# Patient Record
Sex: MALE | Race: WHITE | NOT HISPANIC OR LATINO | Employment: OTHER | URBAN - METROPOLITAN AREA
[De-identification: names, ages, dates, MRNs, and addresses within clinical notes are randomized per-mention and may not be internally consistent; named-entity substitution may affect disease eponyms.]

---

## 2021-12-18 ENCOUNTER — HOSPITAL ENCOUNTER (EMERGENCY)
Facility: HOSPITAL | Age: 66
Discharge: HOME | End: 2021-12-18
Attending: EMERGENCY MEDICINE
Payer: MEDICARE

## 2021-12-18 VITALS
WEIGHT: 250 LBS | DIASTOLIC BLOOD PRESSURE: 96 MMHG | HEART RATE: 69 BPM | OXYGEN SATURATION: 98 % | RESPIRATION RATE: 20 BRPM | BODY MASS INDEX: 37.89 KG/M2 | SYSTOLIC BLOOD PRESSURE: 132 MMHG | HEIGHT: 68 IN | TEMPERATURE: 98 F

## 2021-12-18 DIAGNOSIS — L72.3 SEBACEOUS CYST: Primary | ICD-10-CM

## 2021-12-18 PROCEDURE — 99282 EMERGENCY DEPT VISIT SF MDM: CPT

## 2021-12-18 RX ORDER — LACOSAMIDE 100 MG/1
TABLET ORAL EVERY 12 HOURS
COMMUNITY
End: 2022-02-15 | Stop reason: SDUPTHER

## 2021-12-18 RX ORDER — LEVOTHYROXINE SODIUM 175 UG/1
175 TABLET ORAL
COMMUNITY

## 2021-12-18 RX ORDER — HYDROGEN PEROXIDE 3 %
20 SOLUTION, NON-ORAL MISCELLANEOUS
COMMUNITY
End: 2022-02-15 | Stop reason: DRUGHIGH

## 2022-02-02 ENCOUNTER — TELEPHONE (OUTPATIENT)
Dept: SURGERY | Facility: CLINIC | Age: 67
End: 2022-02-02
Payer: MEDICARE

## 2022-02-02 ENCOUNTER — TELEPHONE (OUTPATIENT)
Dept: NEUROLOGY | Facility: CLINIC | Age: 67
End: 2022-02-02
Payer: MEDICARE

## 2022-02-02 NOTE — TELEPHONE ENCOUNTER
----- Message from Katherine Briggs sent at 2/2/2022  9:52 AM CST -----  Who Called: Patient    What is the reqeust in detail: Requesting call back to schedule NP appointment to be seen for his neurological ailments post 2008 Brain Surgery. Patient is open to seeing any available provider at ANY available location. Please advise.     Can the clinic reply by [facility]? No    Best Call Back Number: [phone]    Additional Information: Please advise.

## 2022-02-02 NOTE — TELEPHONE ENCOUNTER
Left VM with dept callback for possible scheduling in Neurology. Limited chart so uncertain of immediate needs. New [facility] portal code generated and sent via text to this phone #.

## 2022-02-06 ENCOUNTER — TELEPHONE (OUTPATIENT)
Dept: SURGERY | Facility: CLINIC | Age: 67
End: 2022-02-06
Payer: MEDICARE

## 2022-02-06 NOTE — TELEPHONE ENCOUNTER
Attempted to contact Gregor Cloud to discuss scheduling a consult with DR. Wolf from referral from Justin in regards to a sebaceous cyst.    Left voice mail to return our call at [phone] on [phone] (home).    Ralf Damico MA

## 2022-02-15 ENCOUNTER — OFFICE VISIT (OUTPATIENT)
Dept: NEUROLOGY | Facility: CLINIC | Age: 67
End: 2022-02-15
Payer: MEDICARE

## 2022-02-15 VITALS
HEART RATE: 68 BPM | DIASTOLIC BLOOD PRESSURE: 94 MMHG | SYSTOLIC BLOOD PRESSURE: 132 MMHG | WEIGHT: 266.13 LBS | HEIGHT: 68 IN | RESPIRATION RATE: 18 BRPM | BODY MASS INDEX: 40.33 KG/M2

## 2022-02-15 DIAGNOSIS — R51.9 HEADACHE, UNSPECIFIED HEADACHE TYPE: ICD-10-CM

## 2022-02-15 DIAGNOSIS — M25.50 MULTIPLE JOINT PAIN: ICD-10-CM

## 2022-02-15 DIAGNOSIS — R20.0 RIGHT FACIAL NUMBNESS: ICD-10-CM

## 2022-02-15 DIAGNOSIS — R20.0 RIGHT ARM NUMBNESS: ICD-10-CM

## 2022-02-15 DIAGNOSIS — R41.3 MEMORY LOSS: ICD-10-CM

## 2022-02-15 DIAGNOSIS — R13.10 DYSPHAGIA, UNSPECIFIED TYPE: ICD-10-CM

## 2022-02-15 DIAGNOSIS — H91.90 HEARING LOSS, UNSPECIFIED HEARING LOSS TYPE, UNSPECIFIED LATERALITY: ICD-10-CM

## 2022-02-15 DIAGNOSIS — Z87.820 HISTORY OF CLOSED HEAD INJURY: ICD-10-CM

## 2022-02-15 DIAGNOSIS — R40.4 TRANSIENT ALTERATION OF AWARENESS: ICD-10-CM

## 2022-02-15 DIAGNOSIS — R26.89 IMBALANCE: ICD-10-CM

## 2022-02-15 DIAGNOSIS — E03.9 HYPOTHYROIDISM, UNSPECIFIED TYPE: ICD-10-CM

## 2022-02-15 DIAGNOSIS — E66.01 CLASS 3 SEVERE OBESITY DUE TO EXCESS CALORIES WITHOUT SERIOUS COMORBIDITY WITH BODY MASS INDEX (BMI) OF 40.0 TO 44.9 IN ADULT: ICD-10-CM

## 2022-02-15 PROBLEM — E66.813 CLASS 3 SEVERE OBESITY DUE TO EXCESS CALORIES WITHOUT SERIOUS COMORBIDITY IN ADULT: Status: ACTIVE | Noted: 2022-02-15

## 2022-02-15 PROCEDURE — 99999 PR PBB SHADOW E&M-EST. PATIENT-LVL III: ICD-10-PCS | Mod: PBBFAC,,, | Performed by: NURSE PRACTITIONER

## 2022-02-15 PROCEDURE — 99205 PR OFFICE/OUTPT VISIT, NEW, LEVL V, 60-74 MIN: ICD-10-PCS | Mod: S$GLB,,, | Performed by: NURSE PRACTITIONER

## 2022-02-15 PROCEDURE — 3075F SYST BP GE 130 - 139MM HG: CPT | Mod: CPTII,S$GLB,, | Performed by: NURSE PRACTITIONER

## 2022-02-15 PROCEDURE — 1160F PR REVIEW ALL MEDS BY PRESCRIBER/CLIN PHARMACIST DOCUMENTED: ICD-10-PCS | Mod: CPTII,S$GLB,, | Performed by: NURSE PRACTITIONER

## 2022-02-15 PROCEDURE — 3008F PR BODY MASS INDEX (BMI) DOCUMENTED: ICD-10-PCS | Mod: CPTII,S$GLB,, | Performed by: NURSE PRACTITIONER

## 2022-02-15 PROCEDURE — 1125F AMNT PAIN NOTED PAIN PRSNT: CPT | Mod: CPTII,S$GLB,, | Performed by: NURSE PRACTITIONER

## 2022-02-15 PROCEDURE — 1159F MED LIST DOCD IN RCRD: CPT | Mod: CPTII,S$GLB,, | Performed by: NURSE PRACTITIONER

## 2022-02-15 PROCEDURE — 3288F FALL RISK ASSESSMENT DOCD: CPT | Mod: CPTII,S$GLB,, | Performed by: NURSE PRACTITIONER

## 2022-02-15 PROCEDURE — 1100F PTFALLS ASSESS-DOCD GE2>/YR: CPT | Mod: CPTII,S$GLB,, | Performed by: NURSE PRACTITIONER

## 2022-02-15 PROCEDURE — 3080F PR MOST RECENT DIASTOLIC BLOOD PRESSURE >= 90 MM HG: ICD-10-PCS | Mod: CPTII,S$GLB,, | Performed by: NURSE PRACTITIONER

## 2022-02-15 PROCEDURE — 1160F RVW MEDS BY RX/DR IN RCRD: CPT | Mod: CPTII,S$GLB,, | Performed by: NURSE PRACTITIONER

## 2022-02-15 PROCEDURE — 3075F PR MOST RECENT SYSTOLIC BLOOD PRESS GE 130-139MM HG: ICD-10-PCS | Mod: CPTII,S$GLB,, | Performed by: NURSE PRACTITIONER

## 2022-02-15 PROCEDURE — 99205 OFFICE O/P NEW HI 60 MIN: CPT | Mod: S$GLB,,, | Performed by: NURSE PRACTITIONER

## 2022-02-15 PROCEDURE — 3288F PR FALLS RISK ASSESSMENT DOCUMENTED: ICD-10-PCS | Mod: CPTII,S$GLB,, | Performed by: NURSE PRACTITIONER

## 2022-02-15 PROCEDURE — 1159F PR MEDICATION LIST DOCUMENTED IN MEDICAL RECORD: ICD-10-PCS | Mod: CPTII,S$GLB,, | Performed by: NURSE PRACTITIONER

## 2022-02-15 PROCEDURE — 99999 PR PBB SHADOW E&M-EST. PATIENT-LVL III: CPT | Mod: PBBFAC,,, | Performed by: NURSE PRACTITIONER

## 2022-02-15 PROCEDURE — 3080F DIAST BP >= 90 MM HG: CPT | Mod: CPTII,S$GLB,, | Performed by: NURSE PRACTITIONER

## 2022-02-15 PROCEDURE — 1125F PR PAIN SEVERITY QUANTIFIED, PAIN PRESENT: ICD-10-PCS | Mod: CPTII,S$GLB,, | Performed by: NURSE PRACTITIONER

## 2022-02-15 PROCEDURE — 3008F BODY MASS INDEX DOCD: CPT | Mod: CPTII,S$GLB,, | Performed by: NURSE PRACTITIONER

## 2022-02-15 PROCEDURE — 1100F PR PT FALLS ASSESS DOC 2+ FALLS/FALL W/INJURY/YR: ICD-10-PCS | Mod: CPTII,S$GLB,, | Performed by: NURSE PRACTITIONER

## 2022-02-15 RX ORDER — ESOMEPRAZOLE MAGNESIUM 40 MG/1
CAPSULE, DELAYED RELEASE ORAL
COMMUNITY
Start: 1998-01-01

## 2022-02-15 RX ORDER — CHLORHEXIDINE GLUCONATE ORAL RINSE 1.2 MG/ML
15 SOLUTION DENTAL 2 TIMES DAILY
COMMUNITY
Start: 2022-01-31

## 2022-02-15 RX ORDER — MOMETASONE FUROATE 1 MG/G
OINTMENT TOPICAL
COMMUNITY
Start: 2022-01-03

## 2022-02-15 RX ORDER — SODIUM FLUORIDE 1.1 G/100G
CREAM ORAL 2 TIMES DAILY
COMMUNITY
Start: 2022-01-31

## 2022-02-15 RX ORDER — LACOSAMIDE 200 MG/1
TABLET ORAL
COMMUNITY
Start: 2012-01-12 | End: 2023-05-05

## 2022-02-15 RX ORDER — DONEPEZIL HYDROCHLORIDE 10 MG/1
10 TABLET, FILM COATED ORAL DAILY
COMMUNITY
Start: 2021-12-20 | End: 2023-05-05

## 2022-02-15 RX ORDER — CYCLOSPORINE 0.5 MG/ML
1 EMULSION OPHTHALMIC 2 TIMES DAILY
COMMUNITY
Start: 2022-01-20

## 2022-02-15 RX ORDER — PROMETHAZINE HYDROCHLORIDE 12.5 MG/1
12.5 TABLET ORAL EVERY 6 HOURS PRN
COMMUNITY
Start: 2022-01-18

## 2022-02-15 NOTE — PROGRESS NOTES
"Subjective:      Chief Complaint:  Numbness, Neurologic Problem, Head Injury, Brain Tumor, Gait Problem, and Migraine    The patient is self referred    History of Present Illness  Gregor Cloud is a 66 y.o. 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 He presented with gradual right sided hearing loss.   He has hypothyroidism. He was diagnosed with "class 1 dementia".     Right facial numbness began in 2008 after his acoustic neuroma removed. He was also left with right facial weakness and dryness of the right eye. He has some dysarthria also.     Right arm numbness also began in 2008 after his acoustic neuroma removal.     Dysphagia began post acoustic neuroma removal. He has had swallow studies/imaging. This lasts for several minutes, then resolves. He is not choking, but he feels as if he could choke at times. He has to be careful when he eats.    His headaches have occurred intermittently since 1973. He has had "over 20 head traumas". Once a can of chicken noodle soup hit him in the head, a zebra kicked him in the head, an ostrich kicked him in the head, a horse kicked him in the head, a tree fell on his head, part of a fahad wheel came apart and hit him in the head. He lost consciousness and was in a coma.     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     Headaches are located from his right eye and radiate occipitally. Headaches are constant. Pressure like in quality. Severity is a 10/10. No other signs with his headaches.     He has tried "everything" for his headaches. He thinks that he may have tried Topamax. Unclear if he has tried TCA's, beta blockers. He has not tried Botox.   He has a history of one renal stone. NO history of asthma. He was followed by multiple Neuro " providers prior.     His episodes of altered awareness began in 2008 after his acoustic neuroma removal. He has had multiple EEG's all of which were unremarkable per patient. In the past year, he attended a meeting, which was videotaped, in which he was completely disoriented.     He has not lost consciousness. No loss of bowel or bladder function.     This has occurred when he was driving. He would pull over. He is currently driving.     He experiences episodes of altered awareness 2-3x per week without Vimpat and 2-3x per month on Vimpat. Episodes last for 12 minutes without Vimpat and 2 minutes on Vimpat.     Last Neuroimaging two years ago, and this didn't show any growth to this acoustic neuroma.     He has had Neuropsych testing done per patient.     He had labs done recently to evaluate multiple joint pain.     He lives in [place].     I have reviewed all of this patient's past medical and surgical histories as well as family and social histories and active allergies and medications as documented in the electronic medical record.    Review of Systems  Review of Systems   Constitutional: Negative for activity change, appetite change, fatigue, fever and unexpected weight change.   HENT: Positive for trouble swallowing. Negative for congestion, drooling, ear pain, hearing loss, mouth sores, sinus pressure, tinnitus and voice change.    Eyes: Negative.  Negative for photophobia and visual disturbance.         No Blurred vision   Respiratory: Negative for apnea, choking and shortness of breath.    Cardiovascular: Negative for chest pain, palpitations and leg swelling.   Gastrointestinal: Negative for constipation, diarrhea, nausea and vomiting.   Genitourinary: Negative for dysuria.   Musculoskeletal: Positive for arthralgias. Negative for back pain, gait problem, joint swelling, myalgias, neck pain and neck stiffness.   Skin: Negative for rash.   Neurological: Positive for facial asymmetry, numbness and  headaches. Negative for dizziness, tremors, seizures, syncope, speech difficulty, weakness and light-headedness.        Episodes of altered awareness   Hematological: Does not bruise/bleed easily.   Psychiatric/Behavioral: Negative for agitation, behavioral problems, confusion, decreased concentration, dysphoric mood, hallucinations, sleep disturbance and suicidal ideas. The patient is not nervous/anxious.         Memory loss       Objective:       Vitals:    02/15/22 0850   BP: (!) 132/94   Pulse: 68   Resp: 18     Exam:  Gen Appearance, well developed/nourished in no apparent distress  CV: 2+ distal pulses with no edema or swelling  Neuro:  MS: Awake, alert, oriented to place, person, time, situation. Sustains attention. Recent/remote memory intact, Language is full to spontaneous speech/repetition/naming/comprehension. Fund of Knowledge is full  CN: Optic discs are flat with normal vasculature, PERRL, Extraoccular movements and visual fields are full. Normal facial sensation, but reduced facial strength with testing of facial nerve, Hearing symmetric, Tongue and Palate are midline and strong. Shoulder Shrug symmetric and strong.  Motor: Normal bulk, tone, no abnormal movements. 5/5 strength bilateral upper/lower extremities with 1+ reflexes and bilateral plantar response  Sensory: symmetric to light touch, pain, temp, and vibration/proprioception, except reduced sensation to the right V2/V3 distribution. Romberg positive  Cerebellar: Finger-nose,Heal-shin, Rapid alternating movements intact  Gait: Normal stance, no ataxia    Assessment:      1. Right facial numbness    2. Right arm numbness    3. Memory loss    4. Transient alteration of awareness    5. Headache, unspecified headache type    6. Multiple joint pain    7. Imbalance    8. Hearing loss, unspecified hearing loss type, unspecified laterality    9. History of closed head injury    10. Dysphagia, unspecified type        Plan:      I recommend:  "    Consider  1. He has a complex history including acoustic neuroma/resection in 2008, multiple head injuries, chronic headaches, and episodes of altered awareness.   I would like to perform a thorough review of records to determine which treatments he has attempted for headache prevention and what workup was completed or treatments tried for his TAA episodes.     2. He reports unrevealing EEG's prior, but experienced reduced frequency and duration of episodes on Vimpat.   Will likely need to order a routine EEG, as he has not had one in years, before proceeding with an ambulatory EEG, in an attempt to capture one of his episodes. Should this be unrevealing, I will consider a referral to Epileptology for consideration of EMU monitoring.     3. He has residual right sided hearing loss, as well as right facial weakness and sensory changes post right acoustic neuroma resection. This does not explain his reported RUE numbness, which may require further evaluation with MRI C-spine.     4. Regarding memory complaints, he takes Aricept for diagnosis of "dementia"; however, he has no signs of executive dysfunction.   His short term memory complaints are likely more consistent with traumatic brain injury, given his history.     5. He should NOT drive until he is 6 months clear from episodes of altered awareness. Seizure precautions were reviewed.   I did not restart his Vimpat per prior Neurologist, which he is currently out of. Consider the need to reinitiate this after review of records and evaluation for epilepsy.     6. Obtain records from prior Neurosurgeon, Dr. Zan Lyman in [place], [place]  Neurosurgeon Dr. Can Reyes in [place], [place]  Obtain records from Dr. Martha Genao, Neurologist in [place], [place]  Obtain records from Dr. Erick Nguyen in [place], [place]  Franklin Neal, [place], [place] Psychologist.   [place] [employer] for Neuropsych testing    7. Recent cardiac evaluation in [facility] was unremarkable.     8. He has seen GI for his dysphagia/swallow study and for his hiatal hernia-provider in [place].     9. He has seen a provider at the arthritis and osteoporosis center in [place] for multiple joint pain.   Obtain their records for my review-Dr. Maximus Brito/Rheumatologist in [place], [place]  He had labs, which were unremarkable per patient.     10. He will start exercising on a treadmill in an attempt to lose weight. I did sign paperwork for him to have an attendant at the gym, given his TAA episodes. He should not lift any weights.     FU 3 months/will call with any changes to POC after review of records.       Face to Face time with patient: 75 minutes of total time spent on the encounter, which includes face to face time and non-face to face time preparing to see the patient (eg, review of tests), Obtaining and/or reviewing separately obtained history, Documenting clinical information in the electronic or other health record, Independently interpreting results (not separately reported) and communicating results to the patient/family/caregiver, or Care coordination (not separately reported).

## 2022-02-16 ENCOUNTER — PATIENT MESSAGE (OUTPATIENT)
Dept: NEUROLOGY | Facility: CLINIC | Age: 67
End: 2022-02-16
Payer: MEDICARE

## 2022-02-17 ENCOUNTER — DOCUMENTATION ONLY (OUTPATIENT)
Dept: NEUROLOGY | Facility: CLINIC | Age: 67
End: 2022-02-17
Payer: MEDICARE

## 2022-02-17 ENCOUNTER — TELEPHONE (OUTPATIENT)
Dept: NEUROLOGY | Facility: CLINIC | Age: 67
End: 2022-02-17
Payer: MEDICARE

## 2022-02-17 NOTE — PROGRESS NOTES
Review of records from:  Dr. Maximus Brito at [facility]    Seen for polyarthralgia and had labs:   CBC  ESR  Rheumatoid Factor  CRP  TPO Ab  Thyroglobulin Ab  HLA B 27 Ab  CCP Antibodies  JESSIE titer  CMP  C4/C3    All labs were normal.   Tried Lyrica, but he experienced GI upset with this.   He was offered a steroid injection, which was ineffective.   Rheumatology advised him to see pain management, as his complaints were not autoimmune related.     Requested records from Dr. Can Reyes in [place], whose office stated that there were no records available.     The [place] [employer] is dissolved, and we were unable to request records from this provider.     We were unable to request records from Dr. Martha Genao, as she does not hold an office in [place], [place].

## 2022-02-17 NOTE — TELEPHONE ENCOUNTER
Have we received any response regarding records from:    Neurosurgeon, Dr. Zan Lyman in [place], [place]    Dr. Erick Nguyen in [place], [place]    Or    Te[facility] Y[facility] [place] Psychologist.     There were other agencies that he mentioned to me, which do not exist to obtain records from, and another agency, who reported to not having any records for this patient.     I'd like to check on these last few providers regarding any prior records, before I start ordering testing for my workup.

## 2022-02-27 ENCOUNTER — TELEPHONE (OUTPATIENT)
Dept: SURGERY | Facility: CLINIC | Age: 67
End: 2022-02-27
Payer: MEDICARE

## 2022-02-27 NOTE — TELEPHONE ENCOUNTER
Attempted to contact Gregor Cloud to discuss scheduling a consult with DR. Wolf from referral from Justin in regards to a sebaceous cyst.    Left voice mail to return our call at [phone] on [phone] (home).     Ralf Damico MA

## 2022-03-03 ENCOUNTER — DOCUMENTATION ONLY (OUTPATIENT)
Dept: NEUROLOGY | Facility: CLINIC | Age: 67
End: 2022-03-03
Payer: MEDICARE

## 2022-03-03 ENCOUNTER — TELEPHONE (OUTPATIENT)
Dept: NEUROLOGY | Facility: CLINIC | Age: 67
End: 2022-03-03
Payer: MEDICARE

## 2022-03-03 DIAGNOSIS — Z86.018 HISTORY OF BENIGN SCHWANNOMA: Primary | ICD-10-CM

## 2022-03-03 DIAGNOSIS — R40.4 TRANSIENT ALTERATION OF AWARENESS: ICD-10-CM

## 2022-03-03 NOTE — PROGRESS NOTES
Records reviewed from Dr. Zan Lyman/Neurosurgeon:   He presented in 12/2007 with complaint of complete right sided hearing loss x 1 year, and dryness of the right eye, as well as balance issues, with some memory complaints with brief episodes of memory loss.     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 Some sensory loss to the right face on exam.     MRI Brain 12/20/2007:  Right CP angle mass with possible necrosis, with mass effect on the right cerebral peduncle and cerebellum suggestive of schwannoma.

## 2022-03-03 NOTE — TELEPHONE ENCOUNTER
I have reviewed some of his records that we were able to obtain. I would like to start with an updated MRI Brain, as well as an EEG. He lives in [place], please schedule at [facility].

## 2022-03-10 ENCOUNTER — HOSPITAL ENCOUNTER (OUTPATIENT)
Dept: RADIOLOGY | Facility: HOSPITAL | Age: 67
Discharge: HOME | End: 2022-03-10
Attending: NURSE PRACTITIONER
Payer: MEDICARE

## 2022-03-10 DIAGNOSIS — Z86.018 HISTORY OF BENIGN SCHWANNOMA: ICD-10-CM

## 2022-03-10 DIAGNOSIS — R40.4 TRANSIENT ALTERATION OF AWARENESS: ICD-10-CM

## 2022-03-10 PROCEDURE — 70553 MRI IAC/TEMPORAL BONES W W/O CONTRAST: ICD-10-PCS | Mod: 26,,, | Performed by: RADIOLOGY

## 2022-03-10 PROCEDURE — 70553 MRI BRAIN STEM W/O & W/DYE: CPT | Mod: 26,,, | Performed by: RADIOLOGY

## 2022-03-10 PROCEDURE — A9585 GADOBUTROL INJECTION: HCPCS | Performed by: NURSE PRACTITIONER

## 2022-03-10 PROCEDURE — 25500020 PHARM REV CODE 255: Performed by: NURSE PRACTITIONER

## 2022-03-10 PROCEDURE — 70553 MRI BRAIN STEM W/O & W/DYE: CPT | Mod: TC

## 2022-03-10 RX ORDER — GADOBUTROL 604.72 MG/ML
10 INJECTION INTRAVENOUS
Status: COMPLETED | OUTPATIENT
Start: 2022-03-10 | End: 2022-03-10

## 2022-03-10 RX ADMIN — GADOBUTROL 10 ML: 604.72 INJECTION INTRAVENOUS at 06:03

## 2022-03-15 DIAGNOSIS — D36.10 SCHWANNOMA: Primary | ICD-10-CM

## 2022-03-16 ENCOUNTER — TELEPHONE (OUTPATIENT)
Dept: NEUROSURGERY | Facility: CLINIC | Age: 67
End: 2022-03-16
Payer: MEDICARE

## 2022-03-16 ENCOUNTER — PATIENT MESSAGE (OUTPATIENT)
Dept: NEUROSURGERY | Facility: CLINIC | Age: 67
End: 2022-03-16
Payer: MEDICARE

## 2022-03-16 NOTE — TELEPHONE ENCOUNTER
Pt was offered later appt on 04/19/22 as he requested.   Pt is aware and thanked me for accomodation . Pt has been encouraged to contact clinic should he have any additional questions or concerns prior to appt.

## 2022-03-16 NOTE — TELEPHONE ENCOUNTER
----- Message from Mario Kat sent at 3/16/2022 10:42 AM CDT -----   Patient requesting a return call about r/s'ing the 4-19th appt to 4-15th, Please return call to discuss further

## 2022-03-16 NOTE — TELEPHONE ENCOUNTER
Returned pt call , lvm requesting a call back .         ----- Message from Scarlett Linder sent at 3/16/2022 11:12 AM CDT -----  Regarding: pt advice  Contact: pt @ [phone]  Pt returning missed call from Dr. Beavers's office Please call.

## 2022-03-16 NOTE — TELEPHONE ENCOUNTER
Returned pt call and lvm requesting a call back.           ----- Message from Suyapa Cardenas, Patient Care Assistant sent at 3/16/2022  9:01 AM CDT -----  Regarding: call back  Contact: Pt  Pt is requesting to reschedule his appt that was scheduled on 4/19. Pt states he needs a later time in the evening for his appt.         Pt @ [phone]

## 2022-03-16 NOTE — TELEPHONE ENCOUNTER
Contacted pt again , ( returning call) and almost always get voicemail.   lvm to the effect of his new appt date and time. (04/19/22 as previously discussed and agreed upon.)   Let him know not necessary to call back unless have further questions or concerns, or if this time does not work for him.           ----- Message from Mario Kat sent at 3/16/2022 12:53 PM CDT -----  Contact: @[phone]   Patient returning a missed call from godfrey Crespo return call

## 2022-03-16 NOTE — TELEPHONE ENCOUNTER
"----- Message from Angle Chaidez sent at 3/16/2022  2:01 PM CDT -----  Regarding: Appt. Access  Contact: patient  Pt. Called requesting to speak with "Cherie" in regards to appt. Date 04/19/2022. Pt. Stated he has a different date available.            Pt. @[phone]    "

## 2022-04-22 ENCOUNTER — HOSPITAL ENCOUNTER (OUTPATIENT)
Dept: RADIOLOGY | Facility: OTHER | Age: 67
Discharge: HOME | End: 2022-04-22
Attending: INTERNAL MEDICINE
Payer: MEDICARE

## 2022-04-22 DIAGNOSIS — K57.30 DIVERTICULOSIS OF LARGE INTESTINE WITHOUT PERFORATION OR ABSCESS WITHOUT BLEEDING: ICD-10-CM

## 2022-04-22 DIAGNOSIS — K44.9 HIATAL HERNIA: ICD-10-CM

## 2022-04-22 DIAGNOSIS — K21.9 GERD (GASTROESOPHAGEAL REFLUX DISEASE): ICD-10-CM

## 2022-04-22 PROCEDURE — 25500020 PHARM REV CODE 255: Performed by: INTERNAL MEDICINE

## 2022-04-22 PROCEDURE — 74220 X-RAY XM ESOPHAGUS 1CNTRST: CPT | Mod: 26,,, | Performed by: RADIOLOGY

## 2022-04-22 PROCEDURE — A9698 NON-RAD CONTRAST MATERIALNOC: HCPCS | Performed by: INTERNAL MEDICINE

## 2022-04-22 PROCEDURE — 74220 X-RAY XM ESOPHAGUS 1CNTRST: CPT | Mod: TC

## 2022-04-22 PROCEDURE — 74220 FL ESOPHAGRAM COMPLETE: ICD-10-PCS | Mod: 26,,, | Performed by: RADIOLOGY

## 2022-04-22 RX ADMIN — BARIUM SULFATE 355 ML: 0.6 SUSPENSION ORAL at 11:04

## 2022-09-01 ENCOUNTER — TELEPHONE (OUTPATIENT)
Dept: NEUROLOGY | Facility: CLINIC | Age: 67
End: 2022-09-01
Payer: MEDICARE

## 2022-09-01 NOTE — TELEPHONE ENCOUNTER
----- Message from Travis Valerio sent at 8/10/2022 10:38 AM CDT -----  Regarding: appt soon  Contact: PT @ [phone]  Pt is calling to speak to someone in Neurology to schedule an appt soon. Pt is having episodes / seizures.. previously taking Vimpat 100, which has caused him stomach pain and he stopped it. Pt is asking to see an M.D. before November 1st. No available appts in Epic. Please call. Thanks.

## 2022-11-01 ENCOUNTER — TELEPHONE (OUTPATIENT)
Dept: BARIATRICS | Facility: CLINIC | Age: 67
End: 2022-11-01
Payer: MEDICARE

## 2022-11-01 NOTE — TELEPHONE ENCOUNTER
----- Message from Theresa Brown sent at 11/1/2022  9:03 AM CDT -----  Type: Patient Call Back    Who called:self    What is the request in detail:Pt would like to speak with nurse regarding weight loss. Pt is looking for a doctor and he has some questions. Please call    Can the clinic reply by [facility]?    Would the patient rather a call back or a response via [facility]? call    Best call back number:[phone] (home)

## 2022-11-03 ENCOUNTER — TELEPHONE (OUTPATIENT)
Dept: BARIATRICS | Facility: CLINIC | Age: 67
End: 2022-11-03
Payer: MEDICARE

## 2022-11-15 ENCOUNTER — TELEPHONE (OUTPATIENT)
Dept: BARIATRICS | Facility: CLINIC | Age: 67
End: 2022-11-15
Payer: MEDICARE

## 2022-11-15 NOTE — TELEPHONE ENCOUNTER
Phoned patient regarding a medical weight loss consult in order to have is HH repaired by Dr Delgado in February.  He was referred her by Dr Delgado.  His appointment is the week before with Danny and will just go with the dietician at [facility].  Asked him to gerry back if we can be of further assistance.

## 2022-11-15 NOTE — TELEPHONE ENCOUNTER
----- Message from Vanessa Wright sent at 11/15/2022  8:16 AM CST -----  Regarding: appt  Contact: pt [phone]  Pt is calling to schedule a appt, pt said that someone was supposed him to schedule but no one has. Please call

## 2023-05-04 ENCOUNTER — OFFICE VISIT (OUTPATIENT)
Dept: SURGERY | Facility: CLINIC | Age: 68
End: 2023-05-04
Payer: MEDICARE

## 2023-05-04 VITALS
DIASTOLIC BLOOD PRESSURE: 71 MMHG | WEIGHT: 257.81 LBS | SYSTOLIC BLOOD PRESSURE: 114 MMHG | HEIGHT: 68 IN | BODY MASS INDEX: 39.07 KG/M2 | HEART RATE: 75 BPM

## 2023-05-04 DIAGNOSIS — K44.9 HERNIA, HIATAL: ICD-10-CM

## 2023-05-04 PROCEDURE — 3288F PR FALLS RISK ASSESSMENT DOCUMENTED: ICD-10-PCS | Mod: CPTII,S$GLB,, | Performed by: SURGERY

## 2023-05-04 PROCEDURE — 3008F PR BODY MASS INDEX (BMI) DOCUMENTED: ICD-10-PCS | Mod: CPTII,S$GLB,, | Performed by: SURGERY

## 2023-05-04 PROCEDURE — 99999 PR PBB SHADOW E&M-EST. PATIENT-LVL III: ICD-10-PCS | Mod: PBBFAC,,, | Performed by: SURGERY

## 2023-05-04 PROCEDURE — 3074F SYST BP LT 130 MM HG: CPT | Mod: CPTII,S$GLB,, | Performed by: SURGERY

## 2023-05-04 PROCEDURE — 3078F DIAST BP <80 MM HG: CPT | Mod: CPTII,S$GLB,, | Performed by: SURGERY

## 2023-05-04 PROCEDURE — 3074F PR MOST RECENT SYSTOLIC BLOOD PRESSURE < 130 MM HG: ICD-10-PCS | Mod: CPTII,S$GLB,, | Performed by: SURGERY

## 2023-05-04 PROCEDURE — 1100F PR PT FALLS ASSESS DOC 2+ FALLS/FALL W/INJURY/YR: ICD-10-PCS | Mod: CPTII,S$GLB,, | Performed by: SURGERY

## 2023-05-04 PROCEDURE — 1160F PR REVIEW ALL MEDS BY PRESCRIBER/CLIN PHARMACIST DOCUMENTED: ICD-10-PCS | Mod: CPTII,S$GLB,, | Performed by: SURGERY

## 2023-05-04 PROCEDURE — 3288F FALL RISK ASSESSMENT DOCD: CPT | Mod: CPTII,S$GLB,, | Performed by: SURGERY

## 2023-05-04 PROCEDURE — 1126F PR PAIN SEVERITY QUANTIFIED, NO PAIN PRESENT: ICD-10-PCS | Mod: CPTII,S$GLB,, | Performed by: SURGERY

## 2023-05-04 PROCEDURE — 1100F PTFALLS ASSESS-DOCD GE2>/YR: CPT | Mod: CPTII,S$GLB,, | Performed by: SURGERY

## 2023-05-04 PROCEDURE — 3008F BODY MASS INDEX DOCD: CPT | Mod: CPTII,S$GLB,, | Performed by: SURGERY

## 2023-05-04 PROCEDURE — 3078F PR MOST RECENT DIASTOLIC BLOOD PRESSURE < 80 MM HG: ICD-10-PCS | Mod: CPTII,S$GLB,, | Performed by: SURGERY

## 2023-05-04 PROCEDURE — 1159F PR MEDICATION LIST DOCUMENTED IN MEDICAL RECORD: ICD-10-PCS | Mod: CPTII,S$GLB,, | Performed by: SURGERY

## 2023-05-04 PROCEDURE — 99204 PR OFFICE/OUTPT VISIT, NEW, LEVL IV, 45-59 MIN: ICD-10-PCS | Mod: S$GLB,,, | Performed by: SURGERY

## 2023-05-04 PROCEDURE — 1160F RVW MEDS BY RX/DR IN RCRD: CPT | Mod: CPTII,S$GLB,, | Performed by: SURGERY

## 2023-05-04 PROCEDURE — 99204 OFFICE O/P NEW MOD 45 MIN: CPT | Mod: S$GLB,,, | Performed by: SURGERY

## 2023-05-04 PROCEDURE — 1159F MED LIST DOCD IN RCRD: CPT | Mod: CPTII,S$GLB,, | Performed by: SURGERY

## 2023-05-04 PROCEDURE — 99999 PR PBB SHADOW E&M-EST. PATIENT-LVL III: CPT | Mod: PBBFAC,,, | Performed by: SURGERY

## 2023-05-04 PROCEDURE — 1126F AMNT PAIN NOTED NONE PRSNT: CPT | Mod: CPTII,S$GLB,, | Performed by: SURGERY

## 2023-05-04 NOTE — PROGRESS NOTES
General Surgery Clinic  History and Physical    Subjective:     Gregor Cloud is a 67 y.o. male with h/o HTN, DM, acoustic neuroma s/p surgical resection, seizures, diverticulitis, and obesity, who presents to clinic for previously diagnosed hiatal hernia. Patient mentions that the hernia was diagnosed about 10 years ago. It was about 4 cm at the time and no treatment was done. The hernia continued to grow and in 4/13/2023 received a endoscopy that measured the size at 9 cm. Patient is also experiencing GERD symptoms about once per week, which is dependant on position and diet.        GERD Questionnaire     PPI used 1-2 times/week  Typical heartburn 1-2 times/week, dependant on position and diet  No Regurgitation  No Dysphagia solids  No Dysphagia liquids  No Hoarseness  No Sore throat  Yes Cough  No Asthma  No Chest pain  No Water brash  No Globus  No Nausea  No Vomiting       PMH:   Past Medical History:   Diagnosis Date    Brain tumor (benign)     Headache     Hernia, hiatal     Thyroid disease    -Diverticulitis  -Hypertension    Past Surgical History:   Past Surgical History:   Procedure Laterality Date    BRAIN SURGERY     -Acoustic neuroma   -Unspecified shoulder surgery    Social History:  Social History     Socioeconomic History    Marital status:    Tobacco Use    Smoking status: Never    Smokeless tobacco: Never   Substance and Sexual Activity    Alcohol use: Never    Drug use: Never    Sexual activity: Yes     Partners: Female   -Patient currently engaged    Allergies:   Review of patient's allergies indicates:   Allergen Reactions    Iodine and iodide containing products        Medications:    Current Outpatient Medications on File Prior to Visit   Medication Sig Dispense Refill    chlorhexidine (PERIDEX) 0.12 % solution 15 mLs 2 (two) times daily.      DENTA 5000 PLUS 1.1 % Crea Take by mouth 2 (two) times daily.      esomeprazole (NEXIUM) 40 MG capsule       levothyroxine (SYNTHROID,  LEVOTHROID) 175 MCG tablet Take 175 mcg by mouth before breakfast.      mometasone (ELOCON) 0.1 % ointment SMARTSIG:Sparingly In Ear(s) PRN      promethazine (PHENERGAN) 12.5 MG Tab Take 12.5 mg by mouth every 6 (six) hours as needed.      RESTASIS 0.05 % ophthalmic emulsion Place 1 drop into both eyes 2 (two) times daily.      donepeziL (ARICEPT) 10 MG tablet Take 10 mg by mouth once daily.      lacosamide (VIMPAT) 200 mg Tab tablet        No current facility-administered medications on file prior to visit.     Family History  Mother - Lung cancer (smoker)  Sister - Diverticulitis  Daughter - Unknown stomach issues (believes to be hiatal hernia)    Objective:     PHYSICAL EXAM:  Vital Signs (Most Recent)  Pulse: 75 (05/04/23 1024)  BP: 114/71 (05/04/23 1024)    Review of Systems   Gastrointestinal:  Positive for heartburn and melena. Negative for abdominal pain, blood in stool, constipation, diarrhea, nausea and vomiting.      Physical Exam:  Physical Exam  Constitutional:       Appearance: He is obese.   Abdominal:      General: There is no distension.      Palpations: Abdomen is soft. There is no mass.      Tenderness: There is no abdominal tenderness. There is no guarding.   Neurological:      Mental Status: He is alert.       Labs:  Labs completed on 4/10/2023  CMP - eGFR of 76  Lipid panel - HDL 36, Triglycerides 417, Non-HDL cholesterol 163      Imaging  FL Esophagram found no issues with esophageal motility           Assessment:     67 y.o. male with a hiatal hernia    Plan:     -pH probe to measure stomach acidity.  -Repeat endoscopy to determine if esophagitis is present.   -Request records from prior institutions for prior procedures.  -Pending results of pH probe, schedule hiatal hernia repair surgery.

## 2023-05-04 NOTE — LETTER
May 4, 2023        Maximus Carson MD  [address]  [address]  [place] [address]             [facility]  [address]  [facility] [address]  Phone: [phone]   Patient: Gregor Cloud   MR Number: [id number]   YOB: 1955   Date of Visit: 5/4/2023       Dear Dr. Carson:    Thank you for referring Gregor Cloud to me for evaluation. Attached you will find relevant portions of my assessment and plan of care.    If you have questions, please do not hesitate to call me. I look forward to following Gregor Cloud along with you.    Sincerely,      Gael Wolf MD            CC    No Recipients    Enclosure

## 2023-05-04 NOTE — PROGRESS NOTES
"I have seen the patient, reviewed the Student's history and physical, assessment and plan. I have personally interviewed and examined the patient at bedside and: agree with the findings.     66y/o with type 2 obesity (bmi 39.2) currently but has been morbidly obese, insulin resistance, memory loss, seizures/TBIs, s/p acoustic neuroma (right with residual dysphagia and right sided weakness) and large hiatal hernia.  He has gerd symptoms and takes ppi about twice a week.  He also complains of a "hot stomach" which responds to ppi.  He also has cough especially after eating greasy foods.  He says he gets black tarry stools one a week.  He says egd 2022 shows 9 cm hh.  He has to severely restrict his diet due to his hernia and can't lay flat due to gerd.  He did have chest pain due to the hiatal hernia a few years ago and work up for heart cause was negative.    He is going to move to [place] in a year.  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    Cbc, cmp a1c reviewed, mild anemia (possibly due to hh), insulin resistance  Esophagram 2022 reviewed, films viewed    -Prominent posterior indentation upon the cervical esophagus due to anterior osteophytes at the C5-6 level.     -Moderate to large hiatal hernia.     -Note that the examination was performed with the head of the fluoro table raised 20° at the patient request due to strong history of gastroesophageal reflux.       -Esophageal motility appeared grossly unremarkable in this position.       -No gastroesophageal reflux was elicited during this examination.    Symptomatic large hh.  Also some dysphagia after brain surgery.  Obtain egd result, motility and ges.  For robotic hh, possible mesh, possible esophageal lengthening and toupet fundoplication.  Consult bariatricians for weight loss.  Pcp clearance.  2 weeks preop bariatric liquid diet.          "

## 2023-05-05 ENCOUNTER — TELEPHONE (OUTPATIENT)
Dept: ENDOSCOPY | Facility: HOSPITAL | Age: 68
End: 2023-05-05
Payer: MEDICARE

## 2023-05-10 ENCOUNTER — TELEPHONE (OUTPATIENT)
Dept: ENDOSCOPY | Facility: HOSPITAL | Age: 68
End: 2023-05-10
Payer: MEDICARE

## 2023-05-16 ENCOUNTER — TELEPHONE (OUTPATIENT)
Dept: ENDOSCOPY | Facility: HOSPITAL | Age: 68
End: 2023-05-16
Payer: MEDICARE

## 2023-05-16 VITALS — BODY MASS INDEX: 38.65 KG/M2 | WEIGHT: 255 LBS | HEIGHT: 68 IN

## 2023-05-29 ENCOUNTER — HOSPITAL ENCOUNTER (OUTPATIENT)
Dept: RADIOLOGY | Facility: HOSPITAL | Age: 68
Discharge: HOME | End: 2023-05-29
Attending: SURGERY
Payer: MEDICARE

## 2023-05-29 ENCOUNTER — TELEPHONE (OUTPATIENT)
Dept: SURGERY | Facility: CLINIC | Age: 68
End: 2023-05-29
Payer: MEDICARE

## 2023-05-29 DIAGNOSIS — K44.9 HERNIA, HIATAL: ICD-10-CM

## 2023-05-29 PROCEDURE — 78264 GASTRIC EMPTYING IMG STUDY: CPT | Mod: 26,,, | Performed by: STUDENT IN AN ORGANIZED HEALTH CARE EDUCATION/TRAINING PROGRAM

## 2023-05-29 PROCEDURE — 78264 NM GASTRIC EMPTYING: ICD-10-PCS | Mod: 26,,, | Performed by: STUDENT IN AN ORGANIZED HEALTH CARE EDUCATION/TRAINING PROGRAM

## 2023-05-29 PROCEDURE — 78264 GASTRIC EMPTYING IMG STUDY: CPT | Mod: TC

## 2023-05-29 NOTE — TELEPHONE ENCOUNTER
----- Message from Gael Wolf MD sent at 5/29/2023  2:42 PM CDT -----  This is normal and we are still awaiting other studies.

## 2023-05-29 NOTE — TELEPHONE ENCOUNTER
[facility] for patient to call me back at [phone] re: GES results are normal but we are awaiting other results as well.  Dr. Wolf sent him a portal message and he can respond to that if he has questions or call us at [phone].

## 2023-06-15 ENCOUNTER — HOSPITAL ENCOUNTER (OUTPATIENT)
Facility: HOSPITAL | Age: 68
Discharge: HOME | End: 2023-06-15
Attending: INTERNAL MEDICINE | Admitting: INTERNAL MEDICINE
Payer: MEDICARE

## 2023-06-15 VITALS
HEIGHT: 68 IN | BODY MASS INDEX: 37.89 KG/M2 | WEIGHT: 250 LBS | TEMPERATURE: 98 F | HEART RATE: 63 BPM | RESPIRATION RATE: 18 BRPM | SYSTOLIC BLOOD PRESSURE: 153 MMHG | DIASTOLIC BLOOD PRESSURE: 92 MMHG | OXYGEN SATURATION: 96 %

## 2023-06-15 DIAGNOSIS — K44.9 HIATAL HERNIA: ICD-10-CM

## 2023-06-15 PROCEDURE — 91010 ESOPHAGUS MOTILITY STUDY: CPT | Mod: TC | Performed by: INTERNAL MEDICINE

## 2023-06-15 PROCEDURE — 91037 ESOPH IMPED FUNCTION TEST: CPT | Mod: TC | Performed by: INTERNAL MEDICINE

## 2023-06-15 PROCEDURE — 25000003 PHARM REV CODE 250: Performed by: INTERNAL MEDICINE

## 2023-06-15 RX ORDER — SODIUM CHLORIDE 9 MG/ML
INJECTION, SOLUTION INTRAVENOUS CONTINUOUS
Status: DISCONTINUED | OUTPATIENT
Start: 2023-06-15 | End: 2023-06-15 | Stop reason: HOSPADM

## 2023-06-15 RX ORDER — LIDOCAINE HYDROCHLORIDE 20 MG/ML
JELLY TOPICAL ONCE
Status: COMPLETED | OUTPATIENT
Start: 2023-06-15 | End: 2023-06-15

## 2023-06-15 RX ADMIN — LIDOCAINE HYDROCHLORIDE 10 ML: 20 JELLY TOPICAL at 08:06

## 2023-06-19 ENCOUNTER — TELEPHONE (OUTPATIENT)
Dept: GASTROENTEROLOGY | Facility: CLINIC | Age: 68
End: 2023-06-19
Payer: MEDICARE

## 2023-06-19 PROCEDURE — 91010 ESOPHAGUS MOTILITY STUDY: CPT | Mod: 26,,, | Performed by: INTERNAL MEDICINE

## 2023-06-19 PROCEDURE — 91037 PR GERD TST W/ NASAL IMPEDENCE ELECTROD: ICD-10-PCS | Mod: 26,,, | Performed by: INTERNAL MEDICINE

## 2023-06-19 PROCEDURE — 91037 ESOPH IMPED FUNCTION TEST: CPT | Mod: 26,,, | Performed by: INTERNAL MEDICINE

## 2023-06-19 PROCEDURE — 91010 PR ESOPHAGEAL MOTILITY STUDY, MA2METRY: ICD-10-PCS | Mod: 26,,, | Performed by: INTERNAL MEDICINE

## 2023-06-19 NOTE — PROVATION PATIENT INSTRUCTIONS
Discharge Summary/Instructions after an Endoscopic Procedure  Patient Name: Gregor Cloud  Patient MRN: [id number]  Patient YOB: 1955 Monday, June 19, 2023  Belia Osorio MD  Dear patient,  As a result of recent federal legislation (The Federal Cures Act), you may   receive lab or pathology results from your procedure in your [facility]   account before your physician is able to contact you. Your physician or   their representative will relay the results to you with their   recommendations at their soonest availability.  Thank you,  RESTRICTIONS:  During your procedure today, you received medications for sedation.  These   medications may affect your judgment, balance and coordination.  Therefore,   for 24 hours, you have the following restrictions:   - DO NOT drive a car, operate machinery, make legal/financial decisions,   sign important papers or drink alcohol.    ACTIVITY:  Today: no heavy lifting, straining or running due to procedural   sedation/anesthesia.  The following day: return to full activity including work.  DIET:  Eat and drink normally unless instructed otherwise.     TREATMENT FOR COMMON SIDE EFFECTS:  - Mild abdominal pain, nausea, belching, bloating or excessive gas:  rest,   eat lightly and use a heating pad.  - Sore Throat: treat with throat lozenges and/or gargle with warm salt   water.  - Because air was used during the procedure, expelling large amounts of air   from your rectum or belching is normal.  - If a bowel prep was taken, you may not have a bowel movement for 1-3 days.    This is normal.  SYMPTOMS TO WATCH FOR AND REPORT TO YOUR PHYSICIAN:  1. Abdominal pain or bloating, other than gas cramps.  2. Chest pain.  3. Back pain.  4. Signs of infection such as: chills or fever occurring within 24 hours   after the procedure.  5. Rectal bleeding, which would show as bright red, maroon, or black stools.   (A tablespoon of blood from the rectum is not serious, especially if    hemorrhoids are present.)  6. Vomiting.  7. Weakness or dizziness.  GO DIRECTLY TO THE NEAREST EMERGENCY ROOM IF YOU HAVE ANY OF THE FOLLOWING:      Difficulty breathing              Chills and/or fever over 101 F   Persistent vomiting and/or vomiting blood   Severe abdominal pain   Severe chest pain   Black, tarry stools   Bleeding- more than one tablespoon   Any other symptom or condition that you feel may need urgent attention  Your doctor recommends these additional instructions:  If any biopsies were taken, your doctors clinic will contact you in 1 to 2   weeks with any results.  - Return to my office as previously scheduled.  For questions, problems or results please call your physician - Belia Osorio MD at Work:  [phone].  [facility], EMERGENCY ROOM PHONE NUMBER: [phone]  IF A COMPLICATION OR EMERGENCY SITUATION ARISES AND YOU ARE UNABLE TO REACH   YOUR PHYSICIAN - GO DIRECTLY TO THE EMERGENCY ROOM.  Belia Osorio MD  6/19/2023 8:50:55 AM  This report has been verified and signed electronically.  Dear patient,  As a result of recent federal legislation (The Federal Cures Act), you may   receive lab or pathology results from your procedure in your [facility]   account before your physician is able to contact you. Your physician or   their representative will relay the results to you with their   recommendations at their soonest availability.  Thank you,  PROVATION

## 2023-06-19 NOTE — TELEPHONE ENCOUNTER
Manometry Results:    Normal LES pressure with complete relaxation   No [place] Classification abnormality   Complete bolus clearance  Hiatal hernia   No significant difference with provocative maneuvers   Cleared 200 cc bolus, followed by GERD episode    Please see report in media

## 2023-06-21 ENCOUNTER — TELEPHONE (OUTPATIENT)
Dept: SURGERY | Facility: CLINIC | Age: 68
End: 2023-06-21
Payer: MEDICARE

## 2023-06-21 NOTE — TELEPHONE ENCOUNTER
----- Message from Lisa Lara, RN sent at 6/20/2023  8:48 AM CDT -----  Can you look into this please?    Thanks,  C  ----- Message -----  From: Gael Wolf MD  Sent: 6/19/2023   5:23 PM CDT  To: Luc[facility] DOWNEY [facility] Staff    I think I sent you a note on this.  We are waiting for the outside egd report?  ----- Message -----  From: Interface, Lab In seven  Sent: 6/19/2023   8:51 AM CDT  To: Gael Wolf MD

## 2023-06-23 ENCOUNTER — TELEPHONE (OUTPATIENT)
Dept: SURGERY | Facility: CLINIC | Age: 68
End: 2023-06-23
Payer: MEDICARE

## 2023-06-26 ENCOUNTER — TELEPHONE (OUTPATIENT)
Dept: SURGERY | Facility: CLINIC | Age: 68
End: 2023-06-26
Payer: MEDICARE

## 2023-06-29 ENCOUNTER — TELEPHONE (OUTPATIENT)
Dept: BARIATRICS | Facility: CLINIC | Age: 68
End: 2023-06-29
Payer: MEDICARE

## 2023-07-11 ENCOUNTER — TELEPHONE (OUTPATIENT)
Dept: SURGERY | Facility: CLINIC | Age: 68
End: 2023-07-11
Payer: MEDICARE

## 2023-07-11 ENCOUNTER — PATIENT MESSAGE (OUTPATIENT)
Dept: SURGERY | Facility: CLINIC | Age: 68
End: 2023-07-11
Payer: MEDICARE

## 2023-07-11 NOTE — TELEPHONE ENCOUNTER
Pt states since his manometry on 6/19 he's been having difficulty swallowing (can swallow, just takes a lot of effort) during after he eats he is coughing a lot and gagging and his throat is now very irritated.  He wanted Dr. Wolf to know. (Msg routed)  Has a virtual appt with Dr. Wolf on 7/18 to discuss test results, etc.

## 2023-07-11 NOTE — TELEPHONE ENCOUNTER
[facility] Note    I have called the patient twice to discuss his symptoms since his manometry on 6/19 but I have been unable to reach him.  GI clinic follow up to be arranged.    Love Rhodes MD

## 2023-07-11 NOTE — TELEPHONE ENCOUNTER
----- Message from Mary Mckeon sent at 7/11/2023  2:16 PM CDT -----  Regarding: Question about procedure  Contact: [phone]  Pt requesting a call back regarding a missed call pt has a question about a procedure he had (symptoms).  Please call to discuss further.

## 2023-07-11 NOTE — TELEPHONE ENCOUNTER
----- Message from Avni BENAVIDES Route sent at 7/11/2023 10:25 AM CDT -----  Regarding: Swallowing Test Issues  Contact: pt.[phone]  Pt is calling to speak to provider in ref to swallowing test issues. Patient Requesting Call Back @ pt.[phone]

## 2023-07-12 ENCOUNTER — TELEPHONE (OUTPATIENT)
Dept: GASTROENTEROLOGY | Facility: CLINIC | Age: 68
End: 2023-07-12
Payer: MEDICARE

## 2023-07-12 NOTE — TELEPHONE ENCOUNTER
----- Message from Aydin Lane sent at 7/12/2023  8:27 AM CDT -----  Regarding: Self  [phone]  Type:  Patient Returning Call    Who Called:  Self     Who Left Message for Patient:  Love Rhodes MD    Does the patient know what this is regarding?: yes     Would the patient rather a call back or a response via [facility]?  Call back     Best Call Back Number: [phone]     Additional Information:     Thank you.

## 2023-07-20 ENCOUNTER — TELEPHONE (OUTPATIENT)
Dept: SURGERY | Facility: CLINIC | Age: 68
End: 2023-07-20
Payer: MEDICARE

## 2023-07-20 ENCOUNTER — PATIENT MESSAGE (OUTPATIENT)
Dept: SURGERY | Facility: CLINIC | Age: 68
End: 2023-07-20
Payer: MEDICARE

## 2023-07-20 NOTE — TELEPHONE ENCOUNTER
LVM for patient to call me back at [phone] re: returning his call and to find out what he needs an appointment for exactly.

## 2023-07-20 NOTE — TELEPHONE ENCOUNTER
----- Message from Greta Nunes sent at 7/20/2023  1:13 PM CDT -----  Regarding: returing call  Contact: self [phone]  Pt calling in returning call please call to discuss Further

## 2023-07-20 NOTE — TELEPHONE ENCOUNTER
----- Message from Simran Turpin sent at 7/20/2023  1:19 PM CDT -----  Regarding: Call Back  Contact: Pt [phone]  Pt is returning a call back please call

## 2023-07-20 NOTE — TELEPHONE ENCOUNTER
I sent patient a portal message because he doesn't answer when I call and then he calls back after I call.  I thought it would be more efficient

## 2023-07-20 NOTE — TELEPHONE ENCOUNTER
----- Message from Cat Cardona sent at 7/20/2023 11:28 AM CDT -----  Regarding: AUDIO VIRTUAL APPOINTMENT  Contact: [phone]  Calling in regards to scheduling an audio virtual appointment as soon as possible. Please call and schedule.

## 2023-09-06 NOTE — PROGRESS NOTES
Subjective     Patient ID: Gregor Cloud is a 68 y.o. male.    Chief Complaint: Consult    CC:weight    New pt to me, referred by Gael Wolf MD  [address] [place] [address] , with Patient Active Problem List:     Right facial numbness     Right arm numbness     Memory loss     Transient alteration of awareness     Headache, unspecified headache type     Multiple joint pain     Imbalance     Hearing loss     History of closed head injury     Dysphagia     Hypothyroidism     Class 3 severe obesity due to excess calories without serious comorbidity in adult     Hernia, hiatal    + h/o seizures.     Lab Results       Component                Value               Date                       HGBA1C                   6.0 (H)             11/03/2022            Lab Results       Component                Value               Date                       LDLCALC                                      04/10/2023              Comment:    LDL not available, Triglyceride >400       CREATININE               1.07                04/10/2023                Current attempts at weight loss:  Will be on 2 week liquid diet. Has decreased fried foods. Raquet ball 3 days a week.     Previous diet attempts: denies.     History of medication for loss:  Denies.  checked today. Was on medical marijuana, but not filled since Nov 2022.     Heaviest weight: 270#    Lightest weight: 180#    Goal weight: 235# for HH repair.       Last eye exam:     in past year. No glaucoma per pt.  Provider:    Typical eating patterns:  Retired.  Lives with wife. She does cooking.   Breakfast:  cereal, fruit. Weekends: same.     lunch: chicken or fish, broccoli and rice or green beans,     dinner: cheese and crackers. Tamales.     snacks: nuts. Ice pops    Beverages:  water, sports drinks, ETOH- denies.     Willingness to change:  10/10     Cardiac studies: Had echo at [facility] yesterday.     BMR: 1956    PBF:  39.6%      Review of  "Systems       Objective   BP (!) 160/100   Pulse 73   Ht 5' 7.5" (1.715 m)   Wt 121.5 kg (267 lb 14.4 oz)   SpO2 98%   BMI 41.34 kg/m²     Physical Exam  Vitals reviewed.   Constitutional:       General: He is not in acute distress.     Appearance: He is well-developed.      Comments: With severe obesity     HENT:      Head: Normocephalic and atraumatic.   Eyes:      General: No scleral icterus.     Pupils: Pupils are equal, round, and reactive to light.   Neck:      Thyroid: No thyromegaly.   Cardiovascular:      Rate and Rhythm: Normal rate and regular rhythm.      Heart sounds: Normal heart sounds. No murmur heard.     No friction rub. No gallop.   Pulmonary:      Effort: Pulmonary effort is normal. No respiratory distress.      Breath sounds: Normal breath sounds. No wheezing.   Abdominal:      General: Bowel sounds are normal. There is no distension.      Palpations: Abdomen is soft.      Tenderness: There is no abdominal tenderness.   Musculoskeletal:         General: Normal range of motion.      Cervical back: Normal range of motion and neck supple.   Skin:     General: Skin is warm and dry.   Neurological:      Mental Status: He is alert and oriented to person, place, and time.   Psychiatric:         Behavior: Behavior normal.         Judgment: Judgment normal.            Assessment and Plan     1. Class 3 severe obesity due to excess calories with serious comorbidity and body mass index (BMI) of 40.0 to 44.9 in adult    2. Prediabetes  -     metFORMIN (GLUCOPHAGE-XR) 500 MG ER 24hr tablet; Take 1 tablet (500 mg total) by mouth daily with breakfast.  Dispense: 90 tablet; Refill: 0    3. Memory loss    4. Hernia, hiatal        1. Class 3 severe obesity due to excess calories with serious comorbidity and body mass index (BMI) of 40.0 to 44.9 in adult  Medical and surgical options discussed today.  1500 gerry pb meal planner and meal ideas given.     Increase low impact activity as tolerated.  Avoid high " impact activity, very heavy lifting or other exercise regimens that may cause discomfort.     2. Prediabetes  Discussed decreasing the risks of diabetes with changes in diet, routine exercise and losing weight.    - metFORMIN (GLUCOPHAGE-XR) 500 MG ER 24hr tablet; Take 1 tablet (500 mg total) by mouth daily with breakfast.  Dispense: 90 tablet; Refill: 0    3. Memory loss  Avoid  topiramate       4. Hernia, hiatal  Optimize BMI. Follow up with Dr. Wolf as planned.            No follow-ups on file.

## 2023-09-07 ENCOUNTER — OFFICE VISIT (OUTPATIENT)
Dept: BARIATRICS | Facility: CLINIC | Age: 68
End: 2023-09-07
Payer: MEDICARE

## 2023-09-07 VITALS
HEART RATE: 73 BPM | HEIGHT: 68 IN | WEIGHT: 267.88 LBS | SYSTOLIC BLOOD PRESSURE: 160 MMHG | DIASTOLIC BLOOD PRESSURE: 100 MMHG | OXYGEN SATURATION: 98 % | BODY MASS INDEX: 40.6 KG/M2

## 2023-09-07 DIAGNOSIS — R73.03 PREDIABETES: ICD-10-CM

## 2023-09-07 DIAGNOSIS — R41.3 MEMORY LOSS: ICD-10-CM

## 2023-09-07 DIAGNOSIS — K44.9 HERNIA, HIATAL: ICD-10-CM

## 2023-09-07 DIAGNOSIS — E66.01 CLASS 3 SEVERE OBESITY DUE TO EXCESS CALORIES WITH SERIOUS COMORBIDITY AND BODY MASS INDEX (BMI) OF 40.0 TO 44.9 IN ADULT: Primary | ICD-10-CM

## 2023-09-07 PROCEDURE — 3077F SYST BP >= 140 MM HG: CPT | Mod: CPTII,S$GLB,, | Performed by: INTERNAL MEDICINE

## 2023-09-07 PROCEDURE — 1160F PR REVIEW ALL MEDS BY PRESCRIBER/CLIN PHARMACIST DOCUMENTED: ICD-10-PCS | Mod: CPTII,S$GLB,, | Performed by: INTERNAL MEDICINE

## 2023-09-07 PROCEDURE — 1159F PR MEDICATION LIST DOCUMENTED IN MEDICAL RECORD: ICD-10-PCS | Mod: CPTII,S$GLB,, | Performed by: INTERNAL MEDICINE

## 2023-09-07 PROCEDURE — 3008F PR BODY MASS INDEX (BMI) DOCUMENTED: ICD-10-PCS | Mod: CPTII,S$GLB,, | Performed by: INTERNAL MEDICINE

## 2023-09-07 PROCEDURE — 3288F FALL RISK ASSESSMENT DOCD: CPT | Mod: CPTII,S$GLB,, | Performed by: INTERNAL MEDICINE

## 2023-09-07 PROCEDURE — 3080F PR MOST RECENT DIASTOLIC BLOOD PRESSURE >= 90 MM HG: ICD-10-PCS | Mod: CPTII,S$GLB,, | Performed by: INTERNAL MEDICINE

## 2023-09-07 PROCEDURE — 3288F PR FALLS RISK ASSESSMENT DOCUMENTED: ICD-10-PCS | Mod: CPTII,S$GLB,, | Performed by: INTERNAL MEDICINE

## 2023-09-07 PROCEDURE — 1160F RVW MEDS BY RX/DR IN RCRD: CPT | Mod: CPTII,S$GLB,, | Performed by: INTERNAL MEDICINE

## 2023-09-07 PROCEDURE — 1125F AMNT PAIN NOTED PAIN PRSNT: CPT | Mod: CPTII,S$GLB,, | Performed by: INTERNAL MEDICINE

## 2023-09-07 PROCEDURE — 3080F DIAST BP >= 90 MM HG: CPT | Mod: CPTII,S$GLB,, | Performed by: INTERNAL MEDICINE

## 2023-09-07 PROCEDURE — 1100F PR PT FALLS ASSESS DOC 2+ FALLS/FALL W/INJURY/YR: ICD-10-PCS | Mod: CPTII,S$GLB,, | Performed by: INTERNAL MEDICINE

## 2023-09-07 PROCEDURE — 99204 PR OFFICE/OUTPT VISIT, NEW, LEVL IV, 45-59 MIN: ICD-10-PCS | Mod: S$GLB,,, | Performed by: INTERNAL MEDICINE

## 2023-09-07 PROCEDURE — 99999 PR PBB SHADOW E&M-EST. PATIENT-LVL IV: CPT | Mod: PBBFAC,,, | Performed by: INTERNAL MEDICINE

## 2023-09-07 PROCEDURE — 1100F PTFALLS ASSESS-DOCD GE2>/YR: CPT | Mod: CPTII,S$GLB,, | Performed by: INTERNAL MEDICINE

## 2023-09-07 PROCEDURE — 99999 PR PBB SHADOW E&M-EST. PATIENT-LVL IV: ICD-10-PCS | Mod: PBBFAC,,, | Performed by: INTERNAL MEDICINE

## 2023-09-07 PROCEDURE — 99204 OFFICE O/P NEW MOD 45 MIN: CPT | Mod: S$GLB,,, | Performed by: INTERNAL MEDICINE

## 2023-09-07 PROCEDURE — 1159F MED LIST DOCD IN RCRD: CPT | Mod: CPTII,S$GLB,, | Performed by: INTERNAL MEDICINE

## 2023-09-07 PROCEDURE — 3008F BODY MASS INDEX DOCD: CPT | Mod: CPTII,S$GLB,, | Performed by: INTERNAL MEDICINE

## 2023-09-07 PROCEDURE — 3077F PR MOST RECENT SYSTOLIC BLOOD PRESSURE >= 140 MM HG: ICD-10-PCS | Mod: CPTII,S$GLB,, | Performed by: INTERNAL MEDICINE

## 2023-09-07 PROCEDURE — 1125F PR PAIN SEVERITY QUANTIFIED, PAIN PRESENT: ICD-10-PCS | Mod: CPTII,S$GLB,, | Performed by: INTERNAL MEDICINE

## 2023-09-07 RX ORDER — METFORMIN HYDROCHLORIDE 500 MG/1
500 TABLET, EXTENDED RELEASE ORAL
Qty: 90 TABLET | Refills: 0 | Status: SHIPPED | OUTPATIENT
Start: 2023-09-07 | End: 2023-09-12

## 2023-09-07 NOTE — PATIENT INSTRUCTIONS
"Start metformin with dinner.  Always take with food.  No Alcohol.     Increase low impact activity as tolerated.  Avoid high impact activity, very heavy lifting or other exercise regimens that may cause discomfort.                 1500 calorie  Meal Plan  STARCHES 80 CALORIES PER SERVING 15g CARB, 3g PROTEIN, 1g FAT  Servings per day   bread   tortilla   crackers   cooked cereals   dry cereals   pasta   rice   corn   popcorn   potato (small)   potato, mashed   sweet potato   squash, winter   cooked beans, peas, lentils (add 1 meat exchange)   1 slice   1 (6")   4-6 (3/4 oz)   1/2 cup   3/4 cup   1/2 cup   1/3 cup  1/2 cup   1 small light bag  1 (3 oz)   1/2 cup   1/3 cup   1 cup   1/2 cup Most starches are a good source of B vitamins   Choose whole grain foods such as 100% whole wheat bread and flour, brown rice, tortillas, etc. for nutrients and fiber.   Combine beans (starch & meat) with grains (starch) for their complimentary proteins and fiber   Combine grains (starch) with milk (milk) or cheese (meat) to compliment proteins     5   FRUIT 60 CALORIES PER SERVING 15g CARB    fresh fruit   banana  melon (cubes)   berries  canned fruit   dried fruit    1 small   1/2  ½ cup   ¾ cup  ½ cup   ¼ cup    Choose whole fruits for fiber   No fruit juices   2   DAIRY  CALORIES PER SERVING 12g CARB, 8g PROTEIN, 0-8g FAT    milk   yogurt  Protein soy or almond milk 1 cup   1 cup   1 cup Use unsweetened almond or soy milk with added protein  Avoid chocolate or flavored milk  Avoid yogurt with more than 8 gms of sugar. Gms of protein should be higher than grams of sugar               2   MEAT AND SUBSTITUES  CALORIES PER SERVING 7g Protein, 0-13g FAT    Meat,Seafood and fish   cheese   cottage cheese   egg   peanut butter   tofu or tempeh  cooked beans, peas, lentils (add 1 starch)  Quinoa (add 1 starch)   Nuts and seeds (½ serving protein + 1 fat)  Nutritional yeast  Morning Star grillers 1 oz     1 oz  1/4 cup "     1   1.5 Tbsp   4 oz (1/2 cup)   1/2 cup              ¼ cup            2 tablespoons    1 elsa or ½ cup      Choose fish, seafood and lower fat cheeses  Limit frying or adding fat.      11   FATS 45 CALORIES PER SERVING     oil   mayonnaise   cream cheese   salad dressing   peanuts   avocado   butter or margarine    1 tsp   1 tsp   1 Tbsp   1 Tbsp   10   1/8   1 tsp Eat less fat.   Eat less saturated fat such as animal fat found in fatter meat, cheese, and butter. Also eat less hydrogenated fat.      2-3   VEGETABLES 25 CALORIES PER SERVING 5 g CARB, 2g PROTEIN    raw vegetables   cooked vegetables   tomato or vegetable juice 1 cup   1/2 cup     1/2 cup Choose dark green leafy and deep yellow vegetables such as spinach, zuchinni, squash, mushrooms, cauliflower ,broccoli, carrots, and peppers.   Unlimited       1500 CALORIE MEAL PLAN  Eat 3 small meals per day with 1-2 small snacks to keep you full throughout the day  Aim for at least 15 gms of protein at breakfast, at least 25 grams at lunch and at least 25 grams of protein at dinner.  Snacks should contain at least 5 grams of protein  Limit starches to 1 serving per meal or snack.  Keep your carbs whole grain or whole wheat  Limit your intake of refined sugar including sugary beverages ie sweet tea, lemonade, fruit punch             Fruit Protein Dairy Starch Fats Calories   Breakfast 1 2 1 1  340   Lunch  3  2 1 370   Dinner 1 4  1 2 450   Snack  2 1 1  360   Total 2 11 2 5 3 1505     Sample breakfasts:  2 scrambled eggs (use spray), 1 cup Protein Silk soy milk, 1 slice whole wheat toast, 1 small orange  Omelet made with 1 egg, 1-ounce low fat cheese and non-starchy veggies, 1 low fat plain yogurt with ½ banana  Plain yogurt with ¾ cup unsweetened cold cereal and ½ cup fresh fruit salad, 2 hardboiled eggs  Sample lunches:  ½ whole wheat bagel topped with 3 ounces of low fat cheese melted in oven with a wild green salad with 1 TBSP dressing  ¾ cup cooked beans  with 6 whole grain crackers, 10 roasted peanuts  ½ medium baked potato with 3 ounces of low fat cheddar cheese and 1 TBSP  sour cream  1 small wheat tortilla brushed with 1 tsp olive oil then brushed with pizza sauce and 4 ounces low fat cheese, sliced mushrooms and green peppers broiled until cheese is melted.  ½ cup chopped melon    Sample Dinners:  4 ounces of tuna pan seared in 1 tsp olive oil, ½ baked small sweet potato and 2 small plums  ½ cup chick peas, 1 cup cauliflower sauteed with 1 tbsp chopped onion, 1 tsp oil, and 2 tsp pabon powder. Add low sodium vegetable stock to desired consistency. Serve with ½ cup brown rice  Red beans seasoned with onions and bell peppers served over cauliflower rice  1 cup cooked green or brown lentils served with ½ cup cooked quinoa and chopped tomatoes and cucumbers and 1 tbsp feta cheese  Sample Snacks:  1 cup of Protein Silk Soy milk with 3 squares alexandra crackers  3/4 ounce Triscuits with 1 ounce cubed cheese  Chobani Triple Zero yogurt with ¾ cup unsweetened cereal  ½ cup edamame (shelled)      Meal Ideas for Regular Bariatric Diet  *Recipes and products available at [email or web address].com      Breakfast: (15-20g protein)    - Egg white omelet: 2 egg whites or ½ cup Egg Beaters. (Optional proteins: cheese, shrimp, black beans, chicken, sliced turkey) (Optional veggies: tomatoes, salsa, spinach, mushrooms, onions, green peppers, or small slice avocado)     - Egg and sausage: 1 egg or ¼ cup Egg Beaters (any variety), with 1 elsa or 2 links of Turkey sausage or Veggie breakfast sausage (Vanessa [employer]ms or [email or web address])    - Crust-less breakfast quiche: To make a glass pie dish, mix 4oz part skim Ricotta, 1 cup skim milk, and 2 eggs as your base. Add protein: shredded cheese, sliced lean ham or turkey, turkey vanegas/sausage. Add veggies: tomato, onion, green onion, mushroom, green pepper, spinach, etc.    - Yogurt parfait: Mix 1 - 6oz container Osmani Rangel N Fit vanilla yogurt,  with ¼ cup Kashi Go Lean cereal    - Cottage cheese and fruit: ½ cup part-skim cottage cheese or ricotta cheese topped with fresh fruit or sugar free preserves     - Rafaela Gomez's Hughilla Egg custard* (add 2 Tbsp instant coffee granules to make Cappuccino Custard*)    - Hi-Protein café latte (skim milk, decaf coffee, 1 scoop protein powder). Optional to add Sugar free syrup or extract flavoring.    Lunch: (20-30g protein)    - ½ cup Black bean soup (Homemade or Progresso), with ¼ cup shredded low-fat cheese. Top with chopped tomato or fresh salsa.     - Lean deli turkey breast and low-fat sliced cheese, mustard or light bell to moisten, rolled up together, or wrapped in a Mahendra lettuce leaf    - Chicken salad made from dinner leftovers, moisten with low-fat salad dressing or light bell. Also try leftover salmon, shrimp, tuna or boiled eggs. Serve ½ cup over dark green salad    - Fat-free canned refried beans, topped with ¼ cup shredded low-fat cheese. Top with chopped tomato or fresh salsa.     - Greek salad: Top mixed greens with 1-2oz grilled chicken, tomatoes, red onions, 2-3 kalamata olives, and sprinkle lightly with feta cheese. Spritz with Balsamic vinegar to taste.     - Crust-less lunch quiche: To make a glass pie dish, mix 4oz part skim Ricotta, 1 cup skim milk, and 2 eggs as your base. Add protein: shredded cheese, sliced lean ham or turkey, shrimp, chicken. Add veggies: tomato, onion, green onion, mushroom, green pepper, spinach, artichoke, broccoli, etc.    - Pizza bake: tomato sauce, low-fat shredded mozzarella and turkey pepperoni or [place] vanegas. Add any veggies.    - Cucumber crab bites: Spread ¼ cup crab dip (lump crabmeat + light cream cheese and green onions) over sliced cucumber.     - Chicken with light spinach and artichoke dip*: Puree in : 6oz cooked and drained spinach, 2 cloves garlic, 1 can cannelloni beans, ½ cup chopped green onions, 1 can drained artichoke hearts (not  marinated in oil), lemon juice and basil. Mix in 2oz chopped up chicken.    Supper: (20-30g protein)    - Serve grilled fish over dark green salad tossed with low-fat dressing, served with grilled asparagus cruz     - Rotisserie chicken salad: served with sliced strawberries, walnuts, fat-free feta cheese crumbles and 1 tbsp Sauers Own Light Raspberry [place] Vinaigrette    - Shrimp cocktail: Dip cold boiled shrimp in homemade low-sugar cocktail sauce (1/2 cup Rupert One Carb ketchup, 2 tbsp horseradish, 1/4 tsp hot sauce, 1 tsp Worcestershire sauce, 1 tbsp freshly-squeezed lemon juice). Serve with dark green salad, walnuts, and crumbled blue cheese drizzled with olive oil and Balsamic vinegar    - Tuna Melt: Spread tuna salad onto 2 thick slices of tomato. Top with low-fat cheese and broil until cheese is melted. May also be made with chicken salad of shrimp salad. [place] with different types of cheeses.    - Homemade low-fat Chili using extra lean ground beef or ground turkey. Top with shredded cheese and salsa as desired. May add dollop fat-free sour cream if desired    - Dinner Omelet with shrimp or chicken and onion, green peppers and chives.    - No noodle lasagna: Use sliced zucchini or eggplant in place of noodles.  Layer with part skim ricotta cheese and low sugar meat sauce (use very lean ground beef or ground turkey).    - Mexican chicken bake: Bake chunks of chicken breast or thigh with taco seasoning, Pace brand enchilada sauce, green onions and low-fat cheese. Serve with ¼ cup black beans or fat free refried beans topped with chopped tomatoes or salsa.    - Kathie frozen meatballs, simmered in Classico Marinara sauce. Different flavors of salsa or spaghetti sauce create different dishes! Sprinkle with parmesan cheese. Serve with grilled or steamed veggies, or a dark green salad.    - Simmer boneless skinless chicken thigh chunks in Classico Marinara sauce or roasted salsa until tender with  chopped onion, bell pepper, garlic, mushrooms, spinach, etc.     - Hamburger, without the bun, dressed the way you like. Served with grilled or steamed veggies.    - Eggplant parmesan: Bake slices of eggplant at 350 degrees for 15 minutes. Layer tomato sauce, sliced eggplant and low-fat mozzarella cheese in a baking dish and cover with foil. Bake 30-40 more minutes or until bubbly. Uncover and bake at 400 degrees for about 15 more minutes, or until top is slightly crisp.    - Fish tacos: grilled/baked white fish, wrapped in Mahendra lettuce leaf, topped with salsa, shredded low-fat cheese, and light coleslaw.    Snacks: (100-200 calories; >5g protein)    - 1 low-fat cheese stick with 8 cherry tomatoes or 1 serving fresh fruit  - 4 thin slices fat-free turkey breast and 1 slice low-fat cheese  - 4 thin slices fat-free honey ham with wedge of melon  - 1/4 cup unsalted nuts with ½ cup fruit  - 6-oz container Dannon Light n Fit vanilla yogurt, topped with 1oz unsalted nuts         - apple, celery or baby carrots spread with 2 Tbsp natural peanut butter or almond butter   - apple slices with 1 oz slice low-fat cheese  - celery, cucumber, bell pepper or baby carrots dipped in ¼ cup hummus bean spread or light spinach and artichoke dip (*recipe in lunch section)  - 100 calorie bag microwave light popcorn with 3 tbsp grated parmesan cheese  - Laurent Links Beef Steak - 14g protein! (similar to beef jerky)  - 2 wedges L[place] Cow - Light Herb & Garlic Cheese with sliced cucumber or green bell pepper  - 1/2 cup low-fat cottage cheese with ¼ cup fruit or ¼ cup salsa  - RTD Protein drinks: Atkins, Low Carb Slim Fast, EAS light, Muscle Milk Light, etc.  - Homemade Protein drinks: GNC Soy95, Isopure, Nectar, UNJURY, Whey Gourmet, etc. Mix 1 scoop powder with 8oz skim/1% milk or light soymilk.  - Protein bars: Atkins, EAS, Pure Protein, Think Thin, Detour, etc. Must have 0-4 grams sugar - Read the label.    Takeout Options: No more  than twice/week  Deli - Salads (no pasta or rice), meats, cheeses. Roasted chicken. Lox (salmon)    Mexican - Platters which don't include tortillas, chips, or rice. Go easy on the beans. Example: Fajitas without the tortillas. Ask the  not to bring chips to the table if they are too tempting.    Greek - Meat or fish and vegetable, but no bread or rice. Including hummus, baba ganoush, etc, is OK. Most sit-down Greek restaurants can provide you with cucumber slices for dipping instead of fe bread.    Fast Food (Avoid as much as possible) - Salads (no croutons and limit salad dressing to 2 tbsp), grilled chicken sandwich without the bun and ask for no bell. Tracees low fat chili or Taco Bell pintos and cheese.    BBQ - The meats are fine if you ask for sauces on the side, but most of the traditional side dishes are loaded with carbs. Chavez slaw, baked beans and BBQ sauce are typically made with sugar.    Chinese - Nothing deep-fried, no rice or noodles. Many Chinese sauces have starch and sugar in them, so you'll have to use your judgement. If you find that these sauces trigger cravings, or cause Dumping, you can ask for the sauce to be made without sugar or just use soy sauce.

## 2023-09-12 ENCOUNTER — TELEPHONE (OUTPATIENT)
Dept: BARIATRICS | Facility: CLINIC | Age: 68
End: 2023-09-12
Payer: MEDICARE

## 2023-09-12 NOTE — TELEPHONE ENCOUNTER
Informed pt of 's response. Pt verbalized understanding. Pt stated he is feeling better and back to normal after stopping metformin.

## 2023-09-12 NOTE — TELEPHONE ENCOUNTER
----- Message from Marvin Newsome sent at 9/12/2023  8:51 AM CDT -----  Regarding: Pt Advice  Contact: pt [phone]  Pt is calling to inform provider that recent prescription (metFORMIN (GLUCOPHAGE-XR) 500 MG ER 24hr tablet 90 tablet ) is causing extreme upset stomach, emesis.  Pt is request an alternative, will discontinue taking.  Please call @ [phone]

## 2023-09-12 NOTE — TELEPHONE ENCOUNTER
----- Message from Marvin Newsome sent at 9/12/2023  8:51 AM CDT -----  Regarding: Pt Advice  Contact: pt [phone]  Pt is calling to inform provider that recent prescription (metFORMIN (GLUCOPHAGE-XR) 500 MG ER 24hr tablet 90 tablet ) is causing extreme upset stomach, emesis.  Pt is request an alternative, will discontinue taking.  Please call @ [phone]

## 2023-09-13 ENCOUNTER — PATIENT MESSAGE (OUTPATIENT)
Dept: SURGERY | Facility: CLINIC | Age: 68
End: 2023-09-13
Payer: MEDICARE

## 2023-10-09 ENCOUNTER — TELEPHONE (OUTPATIENT)
Dept: BARIATRICS | Facility: CLINIC | Age: 68
End: 2023-10-09
Payer: MEDICARE

## 2023-10-09 NOTE — TELEPHONE ENCOUNTER
----- Message from Leslie Song sent at 10/9/2023  3:05 PM CDT -----  Regarding: Clinical  Contact: Kadie @ [phone]  Kadie from [facility] is requesting clinical notes for pt. Asking that it be faxed [phone]

## 2023-10-20 ENCOUNTER — TELEPHONE (OUTPATIENT)
Dept: SURGERY | Facility: CLINIC | Age: 68
End: 2023-10-20
Payer: MEDICARE

## 2023-10-20 NOTE — TELEPHONE ENCOUNTER
LVM for patient to call me back at [phone] re: need to pick a date for surgery if he is still interested.

## 2023-10-20 NOTE — TELEPHONE ENCOUNTER
----- Message from Maria R Oconnell sent at 10/20/2023  1:53 PM CDT -----  Regarding: returning call  Contact: [phone]  NANCY FIGUEROA calling stated he is still interested in the surger hernia but he still need to lose weight.

## 2023-10-23 ENCOUNTER — TELEPHONE (OUTPATIENT)
Dept: SURGERY | Facility: CLINIC | Age: 68
End: 2023-10-23
Payer: MEDICARE

## 2023-10-23 NOTE — TELEPHONE ENCOUNTER
----- Message from Ronald Weiss sent at 10/23/2023  3:43 PM CDT -----  Regarding: Call back  Contact: [phone]  Calling to speak with provider or nurse regarding new pcp doctor that will prescribe weight lost medication. Please call and with patient as soon as possible.

## 2024-02-12 ENCOUNTER — TELEPHONE (OUTPATIENT)
Dept: SURGERY | Facility: CLINIC | Age: 69
End: 2024-02-12
Payer: MEDICARE

## 2024-02-12 ENCOUNTER — TELEPHONE (OUTPATIENT)
Dept: BARIATRICS | Facility: CLINIC | Age: 69
End: 2024-02-12
Payer: MEDICARE

## 2024-02-12 NOTE — TELEPHONE ENCOUNTER
Returned call  Advised that I was waiting on a response from Dr. Wolf before scheduling and that I would be in touch later this week to get him scheduled.  Pt verbalized understanding to all and has no further questions at this time.

## 2024-02-12 NOTE — TELEPHONE ENCOUNTER
----- Message from Raymond Wong sent at 2/12/2024  1:07 PM CST -----  Regarding: return call  PATIENT CALL    Pt called regarding upcoming surgery (ROBOTIC HH). He placed a call earlier today and was just checking on the status. Please call back at [phone]

## 2024-02-12 NOTE — TELEPHONE ENCOUNTER
----- Message from Gloria Lester sent at 2/12/2024  8:15 AM CST -----  Regarding: upcoming surgery  Contact: @ [phone]  Pt is calling to set up his/her upcoming surgery or procedure please call and adv @ [phone]

## 2024-02-15 ENCOUNTER — TELEPHONE (OUTPATIENT)
Dept: GASTROENTEROLOGY | Facility: CLINIC | Age: 69
End: 2024-02-15
Payer: MEDICARE

## 2024-02-15 NOTE — TELEPHONE ENCOUNTER
----- Message from Tayla Moses sent at 2/15/2024  2:40 PM CST -----  Type:  Needs Medical Advice    Who Called:  Pt    Would the patient rather a call back or a response via [facility]?  call  Best Call Back Number:   [phone]  Additional Information:  Gregor is not a pt but has questions regarding the Dr specialties and would like a call back if possible.  Pt wants to verify whether Dr performs  hiatal hernia  procedures.

## 2024-03-05 NOTE — TELEPHONE ENCOUNTER
LVM for patient to call me back at [phone] re: needs an appointment with Luciano to check weight loos and give instructions for pre-op diet, etc.

## 2024-04-10 ENCOUNTER — DOCUMENTATION ONLY (OUTPATIENT)
Dept: AUDIOLOGY | Facility: CLINIC | Age: 69
End: 2024-04-10
Payer: MEDICARE

## 2024-04-10 ENCOUNTER — TELEPHONE (OUTPATIENT)
Dept: AUDIOLOGY | Facility: CLINIC | Age: 69
End: 2024-04-10
Payer: MEDICARE

## 2024-04-10 NOTE — TELEPHONE ENCOUNTER
Left message for patient to call to schedule an audiogram; our office received a referral from his physician.

## 2024-04-10 NOTE — PROGRESS NOTES
Referral came in from an outside [facility] physician for Mr. Cloud to have an audiogram completed. See  for copy of referral.

## 2024-09-12 ENCOUNTER — TELEPHONE (OUTPATIENT)
Dept: SURGERY | Facility: CLINIC | Age: 69
End: 2024-09-12
Payer: MEDICARE

## 2024-09-12 NOTE — TELEPHONE ENCOUNTER
----- Message from Kadie Sewell sent at 9/11/2024  8:44 AM CDT -----  Regarding: FW: Consult/Advisory  Contact: pt @ [phone]  I think this is for General Surgery. Please call pt back.  LL  ----- Message -----  From: Kadie Hernandez RD  Sent: 9/10/2024   3:25 PM CDT  To: Kadie Sewell  Subject: FW: Consult/Advisory                             Hi Kadie,  I'm thinking maybe this message was for you? He was seen in the bariatric department previously.     Thanks! And stay safe!  ----- Message -----  From: Leslie Song  Sent: 9/10/2024   3:12 PM CDT  To: Kadie Hernandez RD  Subject: Consult/Advisory                                 Consult/Advisory     Name Of Caller: Gregor Cloud    Contact Preference:[phone]     Nature of call: pt is calling to speak with you about surgery. Asking for a call back

## 2024-09-12 NOTE — TELEPHONE ENCOUNTER
LILLIAN for patient to call me back at [phone] re:  Did he need the general surgery department or the bariatric department.  Please let me know.

## 2024-09-13 ENCOUNTER — TELEPHONE (OUTPATIENT)
Dept: DIABETES | Facility: CLINIC | Age: 69
End: 2024-09-13
Payer: MEDICARE

## 2025-01-10 ENCOUNTER — TELEPHONE (OUTPATIENT)
Dept: VASCULAR SURGERY | Facility: CLINIC | Age: 70
End: 2025-01-10
Payer: MEDICARE

## 2025-01-10 NOTE — TELEPHONE ENCOUNTER
Contacted pt in response to message. Pt states he would like to schedule appt with vascular surgeon for swelling of his left leg. Pt states his PCP ruled out DVT and recommended he see a vascular surgeon. Notified pt that he must have a referral to vascular surgery and should be seen by Dr. Lassiter in the vein clinic at [facility]. Provided pt with contact information for vein clinic. Pt verbalized understanding and states he will obtain referral.

## 2025-04-09 DIAGNOSIS — R56.9 GENERALIZED-ONSET SEIZURES: ICD-10-CM

## 2025-04-09 DIAGNOSIS — F03.A0 MILD DEMENTIA: Primary | ICD-10-CM

## 2025-05-01 ENCOUNTER — TELEPHONE (OUTPATIENT)
Dept: NEUROLOGY | Facility: CLINIC | Age: 70
End: 2025-05-01
Payer: MEDICARE

## 2025-05-02 ENCOUNTER — TELEPHONE (OUTPATIENT)
Dept: NEUROLOGY | Facility: CLINIC | Age: 70
End: 2025-05-02
Payer: MEDICARE

## 2025-05-02 NOTE — TELEPHONE ENCOUNTER
----- Message from Genesis sent at 5/2/2025 12:49 PM CDT -----  Who called:self  What is the request in detail:pt would like for you to give him a call in regards of updating his pcp in his chart  Can the clinic reply by [facility]? Would the patient rather a call back or a response via [facility]?callback  Best call back number:.[phone] Additional Information:

## 2025-05-06 ENCOUNTER — OFFICE VISIT (OUTPATIENT)
Dept: NEUROLOGY | Facility: CLINIC | Age: 70
End: 2025-05-06
Payer: MEDICARE

## 2025-05-06 ENCOUNTER — TELEPHONE (OUTPATIENT)
Dept: NEUROLOGY | Facility: CLINIC | Age: 70
End: 2025-05-06
Payer: MEDICARE

## 2025-05-06 VITALS
HEIGHT: 67 IN | SYSTOLIC BLOOD PRESSURE: 145 MMHG | WEIGHT: 267.63 LBS | OXYGEN SATURATION: 95 % | BODY MASS INDEX: 42 KG/M2 | HEART RATE: 68 BPM | DIASTOLIC BLOOD PRESSURE: 92 MMHG

## 2025-05-06 DIAGNOSIS — G40.001 LOCALIZATION-RELATED (FOCAL) (PARTIAL) IDIOPATHIC EPILEPSY AND EPILEPTIC SYNDROMES WITH SEIZURES OF LOCALIZED ONSET, NOT INTRACTABLE, WITH STATUS EPILEPTICUS: Primary | ICD-10-CM

## 2025-05-06 DIAGNOSIS — G62.9 POLYNEUROPATHY: ICD-10-CM

## 2025-05-06 DIAGNOSIS — D33.3 ACOUSTIC NEUROMA: ICD-10-CM

## 2025-05-06 DIAGNOSIS — R56.9 GENERALIZED-ONSET SEIZURES: ICD-10-CM

## 2025-05-06 PROCEDURE — 1125F AMNT PAIN NOTED PAIN PRSNT: CPT | Mod: CPTII,S$GLB,, | Performed by: PSYCHIATRY & NEUROLOGY

## 2025-05-06 PROCEDURE — 99204 OFFICE O/P NEW MOD 45 MIN: CPT | Mod: S$GLB,,, | Performed by: PSYCHIATRY & NEUROLOGY

## 2025-05-06 PROCEDURE — 3288F FALL RISK ASSESSMENT DOCD: CPT | Mod: CPTII,S$GLB,, | Performed by: PSYCHIATRY & NEUROLOGY

## 2025-05-06 PROCEDURE — 3008F BODY MASS INDEX DOCD: CPT | Mod: CPTII,S$GLB,, | Performed by: PSYCHIATRY & NEUROLOGY

## 2025-05-06 PROCEDURE — 1159F MED LIST DOCD IN RCRD: CPT | Mod: CPTII,S$GLB,, | Performed by: PSYCHIATRY & NEUROLOGY

## 2025-05-06 PROCEDURE — 3077F SYST BP >= 140 MM HG: CPT | Mod: CPTII,S$GLB,, | Performed by: PSYCHIATRY & NEUROLOGY

## 2025-05-06 PROCEDURE — 3080F DIAST BP >= 90 MM HG: CPT | Mod: CPTII,S$GLB,, | Performed by: PSYCHIATRY & NEUROLOGY

## 2025-05-06 PROCEDURE — 4010F ACE/ARB THERAPY RXD/TAKEN: CPT | Mod: CPTII,S$GLB,, | Performed by: PSYCHIATRY & NEUROLOGY

## 2025-05-06 PROCEDURE — 1100F PTFALLS ASSESS-DOCD GE2>/YR: CPT | Mod: CPTII,S$GLB,, | Performed by: PSYCHIATRY & NEUROLOGY

## 2025-05-06 PROCEDURE — 99999 PR PBB SHADOW E&M-EST. PATIENT-LVL III: CPT | Mod: PBBFAC,,, | Performed by: PSYCHIATRY & NEUROLOGY

## 2025-05-06 RX ORDER — LACOSAMIDE 50 MG/1
50 TABLET ORAL EVERY 12 HOURS
Qty: 60 TABLET | Refills: 11 | Status: SHIPPED | OUTPATIENT
Start: 2025-05-06 | End: 2026-05-06

## 2025-05-06 NOTE — TELEPHONE ENCOUNTER
----- Message from Tech Shea sent at 5/6/2025  8:06 AM CDT -----  Regarding: Patient call back  .Type: Patient Call BackWho called:self What is the request in detail:asking for a call back in regards to asking a couple of questions before his appointment. States he will ask the questions upon the return call Can the clinic reply by [facility]?no Would the patient rather a call back or a response via [facility]? Call Best call back number:.[phone] Information:

## 2025-05-06 NOTE — PROGRESS NOTES
Neurology Clinic Note      Date: 5/6/25  Patient Name: Gregor Cloud   MRN: [id number]   PCP: Jony Keen (Inactive)  Referring Provider: Order, Paper       Wife Jose Roberto    HPI:   Mr. Gregor Cloud is a 70 y.o. male presenting history of seizure, s/p resection of acoustic neuroma. R    Vimpat and Aricept.    Has seen Dr Daljit Martinez,smell something burning, once ia while episode of confusion.  Once spell lasted 45 minutes,    Reports driving.    Today 18/30    Mutiple concussions    No anticonvulsants, no aricept     No vimpat since 2010    I saw patient in 2022.    7/2024 last mri of mri brain done at [facility]    R frontal lobe and R cerebellar encephalomalcia    Denies allergy to lacosamide but it is listed as an allergy    Staes now that vimpat was stopped 4 months              PAST MEDICAL HISTORY:  Past Medical History:   Diagnosis Date    Brain tumor (benign)     Headache     Hernia, hiatal     Seizures     Thyroid disease        PAST SURGICAL HISTORY:  Past Surgical History:   Procedure Laterality Date    BRAIN SURGERY      ESOPHAGEAL MANOMETRY WITH MEASUREMENT OF IMPEDANCE N/A 6/15/2023    Procedure: MANOMETRY, ESOPHAGUS, WITH IMPEDANCE MEASUREMENT;  Surgeon: Belia Osorio MD;  Location: [facility] ([address] [place] [address]);  Service: Endoscopy;  Laterality: N/A;  instructions sent to [facility]  left message to confirm 6/9 mal    SHOULDER SURGERY         CURRENT MEDS:  Current Medications[1]    ALLERGIES:  Review of patient's allergies indicates:   Allergen Reactions    Celecoxib Other (See Comments)     Stomach pain and stiff neck    Iodine and iodide containing products     Lacosamide Rash    Promethazine Rash     Dizziness, stabbing pain       FAMILY HISTORY:  Family History   Problem Relation Name Age of Onset    Cancer Mother         SOCIAL HISTORY:  Social History[2]    Review of Systems:  12 system review of systems is negative except for the symptoms mentioned in HPI.     "  Objective:     Vitals:    25 1312   BP: (!) 145/92   BP Location: Right arm   Patient Position: Sitting   Pulse: 68   SpO2: 95%   Weight: 121.4 kg (267 lb 10.2 oz)   Height: 5' 7" (1.702 m)         General: Well-developed, well-groomed. No apparent distress  Eyes: Anicteric, noninjected.  ENT: Normocephalic, atraumatic.    Respiratory: No respiratory distress.    Cardiovascular:  No carotid bruits or heart murmurs  Abdomen:  Nontender  Skin: No rashes, or lesions, nodules on exposed areas    Neurological examination    Mental status:  Alert, appropriate, knows , todays date, [place], repetition, Dlrow, calculations intact, Trump, DORINA Briceño, proverb ok 1/3  items remembered    Pressured speech, flight of ideas?      Cranial nerves:  Cranial nerves 2-12 normal.    Motor:  Strength and tone normal.  No tremor.      Sensory:  Sensation to pinprick diminished in legs Probably due to polyneuropathy)      Cerebellar:  Finger-nose-finger testing normal bilaterally.  Heel-shin testing normal bilaterally.      Reflexes:  Bilateral biceps jerks, brachioradialis reflexes, knee jerks, ankle jerks, 2+ and symmetric with both toes downgoing.      Gait and station:  Gait normal including posture, rate, and stride length.         Images:            Other Studies:              Assessment and Plan:   Gregor Cloud is a 70 y.o. male presenting partial complicates seizures with recurrent auras.    Suspect ADD.  Suspect polyneuropathy.    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        Skyler Roth MD  Neurology  [facility]         [1]   Current Outpatient Medications   Medication Sig Dispense Refill    esomeprazole (NEXIUM) 40 MG capsule       RESTASIS 0.05 % ophthalmic emulsion Place 1 drop into both eyes 2 (two) times daily.      chlorhexidine (PERIDEX) 0.12 % solution 15 mLs 2 (two) times daily. (Patient not taking: Reported on 5/6/2025)      DENTA 5000 PLUS 1.1 % Crea Take by mouth 2 (two) times daily. (Patient not taking: Reported on 5/6/2025)      levothyroxine (SYNTHROID, LEVOTHROID) 175 MCG tablet Take 175 mcg by mouth before breakfast. (Patient not taking: Reported on 5/6/2025)      mometasone (ELOCON) 0.1 % ointment SMARTSIG:Sparingly In Ear(s) PRN (Patient not taking: Reported on 5/6/2025)       No current facility-administered medications for this visit.   [2]   Social History  Tobacco Use    Smoking status: Never    Smokeless tobacco: Never   Substance Use Topics    Alcohol use: Never    Drug use: Never

## 2025-05-22 ENCOUNTER — HOSPITAL ENCOUNTER (OUTPATIENT)
Dept: RADIOLOGY | Facility: HOSPITAL | Age: 70
Discharge: HOME | End: 2025-05-22
Attending: PSYCHIATRY & NEUROLOGY
Payer: MEDICARE

## 2025-05-22 DIAGNOSIS — D33.3 ACOUSTIC NEUROMA: ICD-10-CM

## 2025-05-22 PROCEDURE — A9585 GADOBUTROL INJECTION: HCPCS | Performed by: PSYCHIATRY & NEUROLOGY

## 2025-05-22 PROCEDURE — 70553 MRI BRAIN STEM W/O & W/DYE: CPT | Mod: 26,,, | Performed by: RADIOLOGY

## 2025-05-22 PROCEDURE — 25500020 PHARM REV CODE 255: Performed by: PSYCHIATRY & NEUROLOGY

## 2025-05-22 PROCEDURE — 70553 MRI BRAIN STEM W/O & W/DYE: CPT | Mod: TC

## 2025-05-22 RX ORDER — GADOBUTROL 604.72 MG/ML
10 INJECTION INTRAVENOUS
Status: COMPLETED | OUTPATIENT
Start: 2025-05-22 | End: 2025-05-22

## 2025-05-22 RX ADMIN — GADOBUTROL 10 ML: 604.72 INJECTION INTRAVENOUS at 02:05

## 2025-05-30 ENCOUNTER — TELEPHONE (OUTPATIENT)
Dept: NEUROLOGY | Facility: CLINIC | Age: 70
End: 2025-05-30
Payer: MEDICARE

## 2025-05-30 NOTE — TELEPHONE ENCOUNTER
----- Message from Skyler Roth MD sent at 5/30/2025 12:39 PM CDT -----  Regarding: RE: Gregor  Please work patient into the schedule as soon as possible.  Hopefully he can come in early next week.  ----- Message -----  From: Ban Paz MA  Sent: 5/30/2025  10:16 AM CDT  To: Skyler Roth MD  Subject: FW: Gregor                                        ----- Message -----  From: Waqas Marquis  Sent: 5/30/2025  10:10 AM CDT  To: [facility] Staff  Subject: Gregor                                          Type: Patient Callback Who called: Gregor What is the request in detail: Patient stated that he is having a problem with the medication lacosamide (VIMPAT) 50 mg Tab. He is starting to have some symptoms and he would like to discuss it with the doctor. Please reach out to the patient as soon as possible. Can the clinic reply by [facility]? Yes Would the patient rather a call back or a response via [facility]? Callback Best call back number: [phone] Information:

## 2025-05-31 ENCOUNTER — PATIENT MESSAGE (OUTPATIENT)
Dept: NEUROLOGY | Facility: CLINIC | Age: 70
End: 2025-05-31
Payer: MEDICARE

## 2025-06-03 ENCOUNTER — PATIENT MESSAGE (OUTPATIENT)
Dept: NEUROLOGY | Facility: CLINIC | Age: 70
End: 2025-06-03
Payer: MEDICARE

## 2025-06-13 ENCOUNTER — TELEPHONE (OUTPATIENT)
Dept: NEUROLOGY | Facility: CLINIC | Age: 70
End: 2025-06-13
Payer: MEDICARE

## 2025-06-13 NOTE — TELEPHONE ENCOUNTER
lm for pt to call to r/s appt  that is scheduled on 07/03/251w/dr osuna  due to provider is out of clinic     thank you

## 2025-07-23 ENCOUNTER — OFFICE VISIT (OUTPATIENT)
Dept: PULMONOLOGY | Facility: CLINIC | Age: 70
End: 2025-07-23
Payer: MEDICARE

## 2025-07-23 VITALS
RESPIRATION RATE: 15 BRPM | OXYGEN SATURATION: 95 % | DIASTOLIC BLOOD PRESSURE: 90 MMHG | HEIGHT: 67 IN | BODY MASS INDEX: 42.32 KG/M2 | WEIGHT: 269.63 LBS | SYSTOLIC BLOOD PRESSURE: 142 MMHG | HEART RATE: 66 BPM

## 2025-07-23 DIAGNOSIS — E66.813 CLASS 3 SEVERE OBESITY DUE TO EXCESS CALORIES WITHOUT SERIOUS COMORBIDITY IN ADULT: ICD-10-CM

## 2025-07-23 DIAGNOSIS — J30.9 ALLERGIC RHINITIS, UNSPECIFIED SEASONALITY, UNSPECIFIED TRIGGER: ICD-10-CM

## 2025-07-23 DIAGNOSIS — R06.09 DOE (DYSPNEA ON EXERTION): Primary | ICD-10-CM

## 2025-07-23 PROCEDURE — 1160F RVW MEDS BY RX/DR IN RCRD: CPT | Mod: CPTII,S$GLB,,

## 2025-07-23 PROCEDURE — 1159F MED LIST DOCD IN RCRD: CPT | Mod: CPTII,S$GLB,,

## 2025-07-23 PROCEDURE — 4010F ACE/ARB THERAPY RXD/TAKEN: CPT | Mod: CPTII,S$GLB,,

## 2025-07-23 PROCEDURE — 3077F SYST BP >= 140 MM HG: CPT | Mod: CPTII,S$GLB,,

## 2025-07-23 PROCEDURE — 1125F AMNT PAIN NOTED PAIN PRSNT: CPT | Mod: CPTII,S$GLB,,

## 2025-07-23 PROCEDURE — 3008F BODY MASS INDEX DOCD: CPT | Mod: CPTII,S$GLB,,

## 2025-07-23 PROCEDURE — 3080F DIAST BP >= 90 MM HG: CPT | Mod: CPTII,S$GLB,,

## 2025-07-23 PROCEDURE — 99999 PR PBB SHADOW E&M-EST. PATIENT-LVL IV: CPT | Mod: PBBFAC,,,

## 2025-07-23 PROCEDURE — 3288F FALL RISK ASSESSMENT DOCD: CPT | Mod: CPTII,S$GLB,,

## 2025-07-23 PROCEDURE — 99205 OFFICE O/P NEW HI 60 MIN: CPT | Mod: S$GLB,,,

## 2025-07-23 PROCEDURE — 1101F PT FALLS ASSESS-DOCD LE1/YR: CPT | Mod: CPTII,S$GLB,,

## 2025-07-23 RX ORDER — DONEPEZIL HYDROCHLORIDE 5 MG/1
5 TABLET, FILM COATED ORAL NIGHTLY
COMMUNITY
Start: 2025-06-17 | End: 2026-06-17

## 2025-07-23 RX ORDER — LEVOTHYROXINE SODIUM 150 UG/1
150 TABLET ORAL
COMMUNITY
Start: 2025-04-26

## 2025-07-23 RX ORDER — CHLORPHENIRAMINE/PSEUDOEPHED 4 MG-60 MG
TABLET ORAL
COMMUNITY
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
## 2025-08-26 ENCOUNTER — HOSPITAL ENCOUNTER (OUTPATIENT)
Dept: RADIOLOGY | Facility: HOSPITAL | Age: 70
Discharge: HOME | End: 2025-08-26
Payer: MEDICARE

## 2025-08-26 DIAGNOSIS — R06.09 DOE (DYSPNEA ON EXERTION): ICD-10-CM

## 2025-08-26 PROCEDURE — 71250 CT THORAX DX C-: CPT | Mod: 26,,, | Performed by: RADIOLOGY

## 2025-08-26 PROCEDURE — 71250 CT THORAX DX C-: CPT | Mod: TC
